# Patient Record
Sex: FEMALE | Race: WHITE | NOT HISPANIC OR LATINO | Employment: OTHER | URBAN - METROPOLITAN AREA
[De-identification: names, ages, dates, MRNs, and addresses within clinical notes are randomized per-mention and may not be internally consistent; named-entity substitution may affect disease eponyms.]

---

## 2017-09-12 ENCOUNTER — CONVERSION ENCOUNTER (OUTPATIENT)
Dept: MAMMOGRAPHY | Facility: HOSPITAL | Age: 67
End: 2017-09-12

## 2018-09-20 ENCOUNTER — OFFICE VISIT CONVERTED (OUTPATIENT)
Dept: FAMILY MEDICINE CLINIC | Facility: CLINIC | Age: 68
End: 2018-09-20
Attending: FAMILY MEDICINE

## 2018-09-20 ENCOUNTER — CONVERSION ENCOUNTER (OUTPATIENT)
Dept: FAMILY MEDICINE CLINIC | Facility: CLINIC | Age: 68
End: 2018-09-20

## 2018-12-04 ENCOUNTER — CONVERSION ENCOUNTER (OUTPATIENT)
Dept: MAMMOGRAPHY | Facility: HOSPITAL | Age: 68
End: 2018-12-04

## 2018-12-26 ENCOUNTER — CONVERSION ENCOUNTER (OUTPATIENT)
Dept: MAMMOGRAPHY | Facility: HOSPITAL | Age: 68
End: 2018-12-26

## 2019-03-18 ENCOUNTER — HOSPITAL ENCOUNTER (OUTPATIENT)
Dept: LAB | Facility: HOSPITAL | Age: 69
Discharge: HOME | End: 2019-03-18

## 2019-03-18 LAB
ALBUMIN SERPL-MCNC: 3.9 G/DL (ref 3.5–5)
ALBUMIN/GLOB SERPL: 1.4 {RATIO} (ref 1.4–2.6)
ALP SERPL-CCNC: 114 U/L (ref 43–160)
ALT SERPL-CCNC: 21 U/L (ref 10–40)
ANION GAP SERPL CALC-SCNC: 15 MMOL/L (ref 8–19)
AST SERPL-CCNC: 32 U/L (ref 15–50)
BASOPHILS # BLD AUTO: 0.03 10*3/UL (ref 0–0.2)
BASOPHILS NFR BLD AUTO: 0.4 % (ref 0–3)
BILIRUB SERPL-MCNC: 0.63 MG/DL (ref 0.2–1.3)
BUN SERPL-MCNC: 10 MG/DL (ref 5–25)
BUN/CREAT SERPL: 13 {RATIO} (ref 6–20)
CALCIUM SERPL-MCNC: 9.5 MG/DL (ref 8.7–10.4)
CHLORIDE SERPL-SCNC: 103 MMOL/L (ref 99–111)
CHOLEST SERPL-MCNC: 154 MG/DL (ref 107–200)
CHOLEST/HDLC SERPL: 3.4 {RATIO} (ref 3–6)
CONV ABS IMM GRAN: 0.05 10*3/UL (ref 0–0.2)
CONV CO2: 27 MMOL/L (ref 22–32)
CONV IMMATURE GRAN: 0.6 % (ref 0–1.8)
CONV TOTAL PROTEIN: 6.7 G/DL (ref 6.3–8.2)
CREAT UR-MCNC: 0.8 MG/DL (ref 0.5–0.9)
DEPRECATED RDW RBC AUTO: 53.3 FL (ref 36.4–46.3)
EOSINOPHIL # BLD AUTO: 0.08 10*3/UL (ref 0–0.7)
EOSINOPHIL # BLD AUTO: 1 % (ref 0–7)
ERYTHROCYTE [DISTWIDTH] IN BLOOD BY AUTOMATED COUNT: 14.9 % (ref 11.7–14.4)
GFR SERPLBLD BASED ON 1.73 SQ M-ARVRAT: >60 ML/MIN/{1.73_M2}
GLOBULIN UR ELPH-MCNC: 2.8 G/DL (ref 2–3.5)
GLUCOSE SERPL-MCNC: 121 MG/DL (ref 65–99)
HBA1C MFR BLD: 12.5 G/DL (ref 12–16)
HCT VFR BLD AUTO: 38.3 % (ref 37–47)
HDLC SERPL-MCNC: 45 MG/DL (ref 40–60)
LDLC SERPL CALC-MCNC: 91 MG/DL (ref 70–100)
LYMPHOCYTES # BLD AUTO: 1.99 10*3/UL (ref 1–5)
MCH RBC QN AUTO: 32.1 PG (ref 27–31)
MCHC RBC AUTO-ENTMCNC: 32.6 G/DL (ref 33–37)
MCV RBC AUTO: 98.5 FL (ref 81–99)
MONOCYTES # BLD AUTO: 0.37 10*3/UL (ref 0.2–1.2)
MONOCYTES NFR BLD AUTO: 4.7 % (ref 3–10)
NEUTROPHILS # BLD AUTO: 5.35 10*3/UL (ref 2–8)
NEUTROPHILS NFR BLD AUTO: 68 % (ref 30–85)
NRBC CBCN: 0 % (ref 0–0.7)
OSMOLALITY SERPL CALC.SUM OF ELEC: 292 MOSM/KG (ref 273–304)
PLATELET # BLD AUTO: 282 10*3/UL (ref 130–400)
PMV BLD AUTO: 10.9 FL (ref 9.4–12.3)
POTASSIUM SERPL-SCNC: 3.9 MMOL/L (ref 3.5–5.3)
RBC # BLD AUTO: 3.89 10*6/UL (ref 4.2–5.4)
SODIUM SERPL-SCNC: 141 MMOL/L (ref 135–147)
TRIGL SERPL-MCNC: 90 MG/DL (ref 40–150)
VARIANT LYMPHS NFR BLD MANUAL: 25.3 % (ref 20–45)
VLDLC SERPL-MCNC: 18 MG/DL (ref 5–37)
WBC # BLD AUTO: 7.87 10*3/UL (ref 4.8–10.8)

## 2019-03-27 ENCOUNTER — OFFICE VISIT CONVERTED (OUTPATIENT)
Dept: SURGERY | Facility: CLINIC | Age: 69
End: 2019-03-27
Attending: NURSE PRACTITIONER

## 2019-06-26 ENCOUNTER — HOSPITAL ENCOUNTER (OUTPATIENT)
Dept: ULTRASOUND IMAGING | Facility: HOSPITAL | Age: 69
Discharge: HOME | End: 2019-06-26
Attending: FAMILY MEDICINE

## 2019-07-15 ENCOUNTER — HOSPITAL ENCOUNTER (OUTPATIENT)
Dept: LAB | Facility: HOSPITAL | Age: 69
Discharge: HOME | End: 2019-07-15

## 2019-07-15 LAB
ALBUMIN SERPL-MCNC: 4 G/DL (ref 3.5–5)
ALBUMIN/GLOB SERPL: 1.6 {RATIO} (ref 1.4–2.6)
ALP SERPL-CCNC: 128 U/L (ref 43–160)
ALT SERPL-CCNC: 18 U/L (ref 10–40)
ANION GAP SERPL CALC-SCNC: 18 MMOL/L (ref 8–19)
AST SERPL-CCNC: 26 U/L (ref 15–50)
BASOPHILS # BLD AUTO: 0.04 10*3/UL (ref 0–0.2)
BASOPHILS NFR BLD AUTO: 0.5 % (ref 0–3)
BILIRUB SERPL-MCNC: 0.57 MG/DL (ref 0.2–1.3)
BUN SERPL-MCNC: 12 MG/DL (ref 5–25)
BUN/CREAT SERPL: 13 {RATIO} (ref 6–20)
CALCIUM SERPL-MCNC: 8.9 MG/DL (ref 8.7–10.4)
CHLORIDE SERPL-SCNC: 101 MMOL/L (ref 99–111)
CHOLEST SERPL-MCNC: 157 MG/DL (ref 107–200)
CHOLEST/HDLC SERPL: 3.1 {RATIO} (ref 3–6)
CONV ABS IMM GRAN: 0.03 10*3/UL (ref 0–0.2)
CONV CO2: 25 MMOL/L (ref 22–32)
CONV IMMATURE GRAN: 0.4 % (ref 0–1.8)
CONV TOTAL PROTEIN: 6.5 G/DL (ref 6.3–8.2)
CREAT UR-MCNC: 0.95 MG/DL (ref 0.5–0.9)
DEPRECATED RDW RBC AUTO: 51.5 FL (ref 36.4–46.3)
EOSINOPHIL # BLD AUTO: 0.07 10*3/UL (ref 0–0.7)
EOSINOPHIL # BLD AUTO: 0.9 % (ref 0–7)
ERYTHROCYTE [DISTWIDTH] IN BLOOD BY AUTOMATED COUNT: 14.8 % (ref 11.7–14.4)
GFR SERPLBLD BASED ON 1.73 SQ M-ARVRAT: >60 ML/MIN/{1.73_M2}
GLOBULIN UR ELPH-MCNC: 2.5 G/DL (ref 2–3.5)
GLUCOSE SERPL-MCNC: 95 MG/DL (ref 65–99)
HBA1C MFR BLD: 12.4 G/DL (ref 12–16)
HCT VFR BLD AUTO: 37.8 % (ref 37–47)
HDLC SERPL-MCNC: 51 MG/DL (ref 40–60)
LDLC SERPL CALC-MCNC: 89 MG/DL (ref 70–100)
LYMPHOCYTES # BLD AUTO: 1.92 10*3/UL (ref 1–5)
MCH RBC QN AUTO: 31.3 PG (ref 27–31)
MCHC RBC AUTO-ENTMCNC: 32.8 G/DL (ref 33–37)
MCV RBC AUTO: 95.5 FL (ref 81–99)
MONOCYTES # BLD AUTO: 0.59 10*3/UL (ref 0.2–1.2)
MONOCYTES NFR BLD AUTO: 7.7 % (ref 3–10)
NEUTROPHILS # BLD AUTO: 4.99 10*3/UL (ref 2–8)
NEUTROPHILS NFR BLD AUTO: 65.4 % (ref 30–85)
NRBC CBCN: 0 % (ref 0–0.7)
OSMOLALITY SERPL CALC.SUM OF ELEC: 290 MOSM/KG (ref 273–304)
PLATELET # BLD AUTO: 287 10*3/UL (ref 130–400)
PMV BLD AUTO: 10.4 FL (ref 9.4–12.3)
POTASSIUM SERPL-SCNC: 3.9 MMOL/L (ref 3.5–5.3)
RBC # BLD AUTO: 3.96 10*6/UL (ref 4.2–5.4)
SODIUM SERPL-SCNC: 140 MMOL/L (ref 135–147)
TRIGL SERPL-MCNC: 84 MG/DL (ref 40–150)
VARIANT LYMPHS NFR BLD MANUAL: 25.1 % (ref 20–45)
VLDLC SERPL-MCNC: 17 MG/DL (ref 5–37)
WBC # BLD AUTO: 7.64 10*3/UL (ref 4.8–10.8)

## 2019-07-26 ENCOUNTER — HOSPITAL ENCOUNTER (OUTPATIENT)
Dept: SURGERY | Facility: HOSPITAL | Age: 69
Setting detail: HOSPITAL OUTPATIENT SURGERY
Discharge: HOME | End: 2019-07-26
Attending: SURGERY

## 2019-08-15 ENCOUNTER — CONVERSION ENCOUNTER (OUTPATIENT)
Dept: SURGERY | Facility: CLINIC | Age: 69
End: 2019-08-15

## 2019-08-15 ENCOUNTER — OFFICE VISIT CONVERTED (OUTPATIENT)
Dept: SURGERY | Facility: CLINIC | Age: 69
End: 2019-08-15
Attending: SURGERY

## 2019-09-25 ENCOUNTER — OFFICE VISIT CONVERTED (OUTPATIENT)
Dept: FAMILY MEDICINE CLINIC | Facility: CLINIC | Age: 69
End: 2019-09-25
Attending: FAMILY MEDICINE

## 2019-12-06 ENCOUNTER — HOSPITAL ENCOUNTER (OUTPATIENT)
Dept: MAMMOGRAPHY | Facility: HOSPITAL | Age: 69
Discharge: HOME | End: 2019-12-06
Attending: FAMILY MEDICINE

## 2021-01-28 ENCOUNTER — HOSPITAL ENCOUNTER (OUTPATIENT)
Dept: OTHER | Facility: HOSPITAL | Age: 71
Discharge: HOME | End: 2021-01-28
Attending: INTERNAL MEDICINE

## 2021-02-23 ENCOUNTER — HOSPITAL ENCOUNTER (OUTPATIENT)
Dept: VACCINE CLINIC | Facility: HOSPITAL | Age: 71
Discharge: HOME | End: 2021-02-23
Attending: INTERNAL MEDICINE

## 2021-03-23 ENCOUNTER — HOSPITAL ENCOUNTER (OUTPATIENT)
Dept: FAMILY MEDICINE CLINIC | Facility: CLINIC | Age: 71
Discharge: HOME | End: 2021-03-23
Attending: FAMILY MEDICINE

## 2021-03-23 ENCOUNTER — OFFICE VISIT CONVERTED (OUTPATIENT)
Dept: FAMILY MEDICINE CLINIC | Facility: CLINIC | Age: 71
End: 2021-03-23
Attending: FAMILY MEDICINE

## 2021-05-14 VITALS
HEIGHT: 66 IN | HEART RATE: 101 BPM | TEMPERATURE: 98.6 F | BODY MASS INDEX: 23.46 KG/M2 | WEIGHT: 146 LBS | SYSTOLIC BLOOD PRESSURE: 120 MMHG | OXYGEN SATURATION: 97 % | DIASTOLIC BLOOD PRESSURE: 62 MMHG

## 2021-05-14 NOTE — PROGRESS NOTES
Progress Note      Patient Name: Luz Maria Burnett   Patient ID: [id number]   Sex: Female   YOB: 1950    Referring Provider: Remberto Shah III MD    Visit Date: 2021    Provider: Remberto Shah III MD   Location: [facility]   Location Address: [address] [place] [address]  [id number]   Location Phone: [phone]          Chief Complaint  · routine check up      History Of Present Illness  Luz Maria Burnett is a 70 year old /White female who presents for evaluation and treatment of: here today for routine check and lab work.      HPI     patient is a 70 years old here for routine yearly check up.  history of psoriasis on methotrexate strong family   history of colon cancer said to have had 2 brothers  of colon cancer patient has had numerous polyps last one on 2019 was a serrated polyp a few years earlier it had other hyperplastic polyps and then adenomatous polyps.  Dr. Rabago ordered a no other colonoscopy  which seems reasonable patient will think about there is no sure that she wants to.    Review of systems     respiratory no shortness of breath dyspnea exertion patient still smokes half pack a day has a 20+ pack years but does not want a yearly CT scans to screen for lung cancer.    Cardiovascular no chest pain no palpitations  GI no gut symptoms.  Line skin sees her regularly.  Musculoskeletal DEXA scan in 2019 is osteopenic not due till  taking vitamin D only taking occasionally needs to take daily 1000 international unit    Physical exam     pulse ox 97 rate 101 blood pressure 120/62 temperature 98.6 weight is 146 is a 3 pound increase  General no distress  Cardiovascular regular rhythm no murmur  Respiratory no increased work of breathing lungs clear equal bilaterally no rales no rhonchi no wheezes  Abdomen soft nontender had ultrasound in the office and your aorta 1.5 cm in 2019    Assessment     #1 smoker does not want  screening   #2 generalized anxiety depression continue fluoxetine  #3 psoriasis on methotrexate   #4 strong family history of cancer and numerous polyps both hyperplastic and adenomatous should have a screening this year 2021 she does not want at this time    Plan     recheck   call us if willing to do the or changes her mind on the low-dose CT of the chest.    Blood work is pending       Past Medical History  Disease Name Date Onset Notes   Colon Polyps --  adenoma   Depression --  --    Psoriasis --  --          Past Surgical History  Procedure Name Date Notes   Cataract exctraction with lens implant --  --    Colonoscopy 2009, 2011 --    Liver Surgery 2010 biopsy   Tubal ligation --  --          Medication List  Name Date Started Instructions   Cholestyramine Light 4 gram oral powder 08/15/2019 take 1 scoop (4 gram) dissolved in 2 to 6 ounces of water or noncarbonated beverage by oral route 2 times per day for 30 days   fluoxetine 20 mg oral capsule 07/22/2020 TAKE 1 CAPSULE(20 MG) BY MOUTH EVERY DAY   Imodium A-D 2 mg Oral tablet  take 2 tablets by oral route bid   methotrexate sodium 2.5 mg oral tablet 08/30/2017 take 4 tablets by oral route once a week for 90 days   Prilosec OTC 20 mg Oral tablet,delayed release (DR/EC)  take 1 tablet by oral route daily         Allergy List  Allergen Name Date Reaction Notes   NO KNOWN DRUG ALLERGIES --  --  --        Allergies Reconciled  Family Medical History  Disease Name Relative/Age Notes   Colon Neoplasm  --          Social History  Finding Status Start/Stop Quantity Notes   Active but no formal exercise --  --/-- --  --    Alcohol Never --/-- --  --     --  --/-- --  --    Tobacco Current every day 12/-- 1 ppd 04/06/2017 - pt smokes 1/2-1 ppd for 51 years          Immunizations  NameDate Admin Mfg Trade Name Lot Number Route Inj VIS Given VIS Publication   COVID Zinwggv9702/25/2021 MOD Moderna COVID-19 Vaccine  NE NE 03/23/2021    Comments:    COVID  "Myvlhtz8202/04/2021 MOD Moderna COVID-19 Vaccine  NE NE 03/23/2021    Comments:    Ilgfogfrx54/14/2020 SKB Fluarix, quadrivalent, preservative free [id number] NE NE 03/23/2021    Comments:    Prevnar 1308/10/2016 [facility] PREVNAR 13 [id number] IM LD 08/10/2016 11/05/2015   Comments:          Review of Systems  · Constitutional  o * See HPI  · Eyes  o * See HPI  · HENT  o * See HPI  · Breasts  o * See HPI  · Cardiovascular  o * See HPI  · Respiratory  o * See HPI  · Gastrointestinal  o * See HPI  · Genitourinary  o * See HPI  · Integument  o * See HPI  · Neurologic  o * See HPI  · Musculoskeletal  o * See HPI  · Endocrine  o * See HPI  · Psychiatric  o * See HPI  · Heme-Lymph  o * See HPI  · Allergic-Immunologic  o * See HPI      Vitals  Date Time BP Position Site L\R Cuff Size HR RR TEMP (F) WT  HT  BMI kg/m2 BSA m2 O2 Sat FR L/min FiO2 HC       03/23/2021 02:51 /62 Sitting    101 - R  98.6 146lbs 0oz 5'  6\" 23.56 1.76 97 %                    Assessment  · Screening for depression     V79.0/Z13.89  · Screening for colon cancer     V76.51/Z12.11  · Encounter for screening for cardiovascular disorders     V81.2/Z13.6  · Nicotine dependence     305.1/F17.200  · Tobacco abuse counseling       Tobacco abuse counseling     V65.42/Z71.6  · Psoriasis     696.8  · Colon Polyps     211.4/K63.5  adenoma  · Medication management     V58.69/Z79.899  · Routine adult health maintenance     V70.0/Z00.00  · Anxiety and depression       Anxiety disorder, unspecified     300.00/F41.9  Major depressive disorder, single episode, unspecified     300.00/F32.9  · Lung cancer screening declined by patient     V64.2/Z53.20  · Osteopenia determined by x-ray     733.90/M85.80    Problems Reconciled  Plan  · Orders  o ACO-17: Screened for tobacco use AND received tobacco cessation intervention (4004F) - 305.1/F17.200 - 03/23/2021  o ACO-17: Screened for tobacco use AND received tobacco cessation intervention (4004F) - V65.42/Z71.6 - 03/23/2021  o ACO " - Pt declines to or was not able to provide an Advance Care Plan or name a Surrogate Decision Maker (1124F) - - 03/23/2021  o Physical, Primary Care Panel (CBC, CMP, Lipid, TSH) [facility] (85904, 40246, 32340, 17079) - V81.2/Z13.6, V58.69/Z79.899, V70.0/Z00.00 - 03/23/2021  o ACO-15: Pneumococcal Vaccine Administered or Previously Received [facility] (4040F) - - 03/23/2021  o ACO-14: Influenza immunization administered or previously received [facility] () - - 03/23/2021  o ACO-39: Current medications updated and reviewed (1159F, ) - - 03/23/2021  · Medications  o fluoxetine 20 mg oral capsule   SIG: take 1 capsule (20 mg) by oral route once daily in the evening for 90 days   DISP: (90) Capsule with 3 refills  Adjusted on 03/23/2021     o Cholestyramine Light 4 gram oral powder   SIG: take 1 scoop (4 gram) dissolved in 2 to 6 ounces of water or noncarbonated beverage by oral route 2 times per day for 30 days   DISP: (1) 210 gm can with 6 refills  Discontinued on 03/23/2021     o Medications have been Reconciled  o Transition of Care or Provider Policy  · Instructions  o Depression Screen completed and scanned into the EMR under the designated folder within the patient's documents.  o Today's PHQ-9 result is __2_  o CMS (Medicare) estimates that one in six persons older than 65 suffers from depression and that depression in older patients occurs in 25% of those with other medical illnesses. US Preventative Services Task Force indicates that depression screening and support improves clinical outcomes in older adults. This service is covered by Medicare once a year as part of helping maintain a healthy you!  o The provider screening met the required time of 15 minutes.  o Patient declines colorectal cancer screening. Discussed risks associated with not having screening performed.   o *Form of nicotine being used: cigarettes  o Patient was strongly encouraged to discontinue use of any nicotine containing product or minimize the  use of the product.  o Tobacco and smoking cessation counseling for more than 3 minutes was completed.  o Patient is taking medications as prescribed and doing well.   o Take all medications as prescribed/directed.  o Patient instructed/educated on their diet and exercise program.  o Patient was educated/instructed on their diagnosis, treatment and medications prior to discharge from the clinic today.  o Patient instructed to seek medical attention urgently for new or worsening symptoms.  o Bring all medicines with their bottles to each office visit.  o Time spent with the patient was 30 minutes, more than 50% face to face.  o Chronic conditions reviewed and taken into consideration for today's treatment plan.  o Discussed Covid-19 precautions including, but not limited to, social distancing, avoid touching your face, and hand washing.             Electronically Signed by: Lynette Cummings, -Author on March 23, 2021 03:52:27 PM  Electronically Co-signed by: Remberto Shah III MD -Reviewer on March 24, 2021 05:52:29 PM

## 2021-05-14 NOTE — PROGRESS NOTES
Progress Note      Patient Name: Luz Maria Burnett   Patient ID: [id number]   Sex: Female   YOB: 1950    Referring Provider: Remberto Shah III MD    Visit Date: March 23, 2021    Provider: Remberto Shah III MD   Location: [facility]   Location Address: [address] [place] [address]  [id number]   Location Phone: [phone]          Chief Complaint  · Annual Wellness Exam      History Of Present Illness  The patient is a 70 year old /White female who has come to this office for her Annual Wellness Visit.   Her Primary Care Provider is Remberto Shah III, MD. Her comprehensive Care Team list, including suppliers, has been updated on the Facesheet. Her medical/family history, height, weight, BMI, and blood pressure have been reviewed and are in the chart. The Health Risk Assessment has been completed and scanned in the chart.   Medications are listed in the medication list.   The active problem list includes: Anxiety and depression, Colon Polyps, Depression, Lung cancer screening declined by patient, Medication management, Nicotine dependence, and Psoriasis   The patient does not have a history of substance use.   Patient reports her diet is adequate.   The Mini-Cog has been administered and is scanned in chart. The results are negative. Her cognitive function is without limitation.   A hearing loss screen was completed today and the result is negative.   Patient does not have any risk factors for depression. Patient completed the PHQ-9 today and it has been scanned in the chart. The total score is 1-4.   The Get Up and Go screen was administered today and the result is negative.   The Blair Index of [place] in ADLs indicated full function (score of 6).   A Falls Risk Assessment has been completed, including a review of home fall hazards and medication review.   Overall, the patient's functional ability is noted by this provider to be within normal  limits. Her level of safety is noted to be within normal limits. Her balance/gait is within normal limits. There have been no falls in the past year. Patient-specific home safety recommendations have been reviewed and a copy has been given to patient.   She denies issues with leaking urine.   There are no additional risk factors identified.   Living Will/Advanced Directive has not previously been completed.   Personalized health advice was given to the patient and a written health screening schedule was established; see Plan for details.       Past Medical History  Disease Name Date Onset Notes   Anxiety and depression 03/23/2021 --    Colon Polyps --  adenoma   Depression --  --    Lung cancer screening declined by patient 03/23/2021 --    Medication management 03/23/2021 --    Nicotine dependence 03/23/2021 --    Psoriasis --  --          Past Surgical History  Procedure Name Date Notes   Cataract exctraction with lens implant --  --    Colonoscopy 2009, 2011 --    Liver Surgery 2010 biopsy   Tubal ligation --  --          Medication List  Name Date Started Instructions   fluoxetine 20 mg oral capsule 03/23/2021 take 1 capsule (20 mg) by oral route once daily in the evening for 90 days   Imodium A-D 2 mg Oral tablet  take 2 tablets by oral route bid   methotrexate sodium 2.5 mg oral tablet 08/30/2017 take 4 tablets by oral route once a week for 90 days   Prilosec OTC 20 mg Oral tablet,delayed release (DR/EC)  take 1 tablet by oral route daily         Allergy List  Allergen Name Date Reaction Notes   NO KNOWN DRUG ALLERGIES --  --  --        Allergies Reconciled  Family Medical History  Disease Name Relative/Age Notes   Colon Neoplasm  --          Social History  Finding Status Start/Stop Quantity Notes   Active but no formal exercise --  --/-- --  --    Alcohol Never --/-- --  --     --  --/-- --  --    Tobacco Current every day 12/-- 1 ppd 03/23/2021 - 3/4 pack QD 04/06/2017 - pt smokes 1/2-1 ppd for 51  "years          Immunizations  NameDate Admin Mfg Trade Name Lot Number Route Inj VIS Given VIS Publication   COVID Tzykeeq4302/25/2021 MOD Moderna COVID-19 Vaccine  NE NE 03/23/2021    Comments:    COVID Ihadjws3002/04/2021 MOD Moderna COVID-19 Vaccine  NE NE 03/23/2021    Comments:    Alnpjmyxw78/14/2020 SKB Fluarix, quadrivalent, preservative free [id number] NE NE 03/23/2021    Comments:    Prevnar 1308/10/2016 WAL PREVNAR 13 [id number] LD 08/10/2016 11/05/2015   Comments:          Vitals  Date Time BP Position Site L\R Cuff Size HR RR TEMP (F) WT  HT  BMI kg/m2 BSA m2 O2 Sat FR L/min FiO2 HC       03/23/2021 02:51 /62 Sitting    101 - R  98.6 146lbs 0oz 5'  6\" 23.56 1.76 97 %                Assessment  · Encounter for Medicare annual wellness exam     V70.0/Z00.00  · Screening for depression     V79.0/Z13.89  · Screening for alcoholism     V79.1/Z13.39  · Advanced care planning/counseling discussion     V65.49/Z71.89  · Nicotine dependence     305.1/F17.200    Problems Reconciled  Plan  · Orders  o Falls Risk Assessment Completed (3288F) - V70.0/Z00.00 - 03/23/2021  o Brief hearing screening (written) [facility] () - V70.0/Z00.00 - 03/23/2021  o Annual Wellness Visit-includes a Personalized Prevention Plan of Service (PPS), SUBSEQUENT VISIT (Medicare) () - V70.0/Z00.00 - 03/23/2021  o ACO-13: Fall Risk Screening with no falls in past year or only one fall without injury in the past year (1101F) - V70.0/Z00.00 - 03/23/2021  o Presence or absence of urinary incontinence assessed (RICHARD) (1090F) - V70.0/Z00.00 - 03/23/2021  o Annual depression screening using the PHQ-9 tool, 15 minutes (22043, ) - V79.0/Z13.89 - 03/23/2021  o ACO-18: Negative screen for clinical depression using a standardized tool () - V79.0/Z13.89 - 03/23/2021  o Annual alcohol screening using the AUDIT-C tool, 15 minutes [facility] (32269, ) - V79.1/Z13.39 - 03/23/2021  o Negative alcohol screening () - V79.1/Z13.39 - " 03/23/2021  o Smoking cessation counseling, 3-10 minutes [facility] (01554) - 305.1/F17.200 - 03/23/2021  o ACO-17: Screened for tobacco use AND received tobacco cessation intervention (4004F) - 305.1/F17.200 - 03/23/2021  o ACO-19: Colorectal cancer screening results documented and reviewed (3017F) - - 03/23/2021  o ACO-20: Screening Mammography documented and reviewed () - - 03/23/2021  o Influenza immunization was not ordered or administered for reasons documented by clinician () - - 03/23/2021  o ACO-15: Pneumococcal Vaccine Administered or Previously Received (4040F) - - 03/23/2021  o ACO-39: Current medications updated and reviewed (1159F, ) - - 03/23/2021  · Medications  o Medications have been Reconciled  o Transition of Care or Provider Policy  · Instructions  o Health Risk Assessment has been reviewed with the patient.  o Written health screening schedule for next 5-10 years was established with patient; information scanned in chart and given/mailed to patient.  o Fall prevention methods discussed and a copy of recommendations given/mailed to patient.  o Today's PHQ-9 score is: _2__  o Audit-C Questionnaire completed and scanned into the EMR under the designated folder within the patient's documents.  o Audit-C score of 0-4 - Negative Screen - Brief Discussion  o CMS (Medicare) covers annual screening for adults for alcohol misuse screening to assure accurate diagnosis, effective treatment, and follow up where appropriate.  o Alcohol use disorder is the most prevalent type of addictive disorder in those 65 and older. It is often associated with depression, and contributes to serious medical conditions, including liver disease and coronary heart disease.  o Face-to-face Advanced Care Planning discussed for a minimum of 16 minutes.  o Discussed benefits of smoking cessation and given suggestions on how to stop smoking. Today's discussion lasted 3-10 minutes.             Electronically Signed by:  Lynette Cummings, -Author on March 23, 2021 04:22:28 PM  Electronically Co-signed by: Remberto Shah III MD -Reviewer on March 24, 2021 05:52:20 PM

## 2021-05-15 VITALS
HEIGHT: 66 IN | SYSTOLIC BLOOD PRESSURE: 120 MMHG | BODY MASS INDEX: 22.98 KG/M2 | WEIGHT: 143 LBS | DIASTOLIC BLOOD PRESSURE: 62 MMHG

## 2021-05-15 VITALS — HEIGHT: 66 IN | BODY MASS INDEX: 23.16 KG/M2 | RESPIRATION RATE: 14 BRPM | WEIGHT: 144.12 LBS

## 2021-05-15 VITALS — WEIGHT: 144 LBS | BODY MASS INDEX: 23.14 KG/M2 | RESPIRATION RATE: 16 BRPM | HEIGHT: 66 IN

## 2021-05-16 VITALS
HEIGHT: 66 IN | BODY MASS INDEX: 23.3 KG/M2 | SYSTOLIC BLOOD PRESSURE: 130 MMHG | DIASTOLIC BLOOD PRESSURE: 62 MMHG | WEIGHT: 145 LBS

## 2021-10-12 ENCOUNTER — OFFICE VISIT (OUTPATIENT)
Dept: SURGERY | Facility: CLINIC | Age: 71
End: 2021-10-12

## 2021-10-12 ENCOUNTER — PREP FOR SURGERY (OUTPATIENT)
Dept: OTHER | Facility: HOSPITAL | Age: 71
End: 2021-10-12

## 2021-10-12 VITALS — WEIGHT: 145.6 LBS | HEART RATE: 87 BPM | BODY MASS INDEX: 23.4 KG/M2 | HEIGHT: 66 IN

## 2021-10-12 DIAGNOSIS — R19.4 CHANGE IN BOWEL HABITS: Primary | ICD-10-CM

## 2021-10-12 DIAGNOSIS — Z86.010 HISTORY OF COLONIC POLYPS: ICD-10-CM

## 2021-10-12 DIAGNOSIS — Z80.0 FAMILY HISTORY OF COLON CANCER: ICD-10-CM

## 2021-10-12 DIAGNOSIS — R19.7 DIARRHEA, UNSPECIFIED TYPE: Primary | ICD-10-CM

## 2021-10-12 DIAGNOSIS — R19.4 CHANGE IN BOWEL HABITS: ICD-10-CM

## 2021-10-12 DIAGNOSIS — R19.7 DIARRHEA: ICD-10-CM

## 2021-10-12 PROCEDURE — 99213 OFFICE O/P EST LOW 20 MIN: CPT | Performed by: NURSE PRACTITIONER

## 2021-10-12 RX ORDER — POLYETHYLENE GLYCOL 3350 17 G/17G
POWDER, FOR SOLUTION ORAL
Qty: 238 EACH | Refills: 0 | Status: ON HOLD | OUTPATIENT
Start: 2021-10-12 | End: 2021-12-09

## 2021-10-12 RX ORDER — SODIUM CHLORIDE 0.9 % (FLUSH) 0.9 %
3 SYRINGE (ML) INJECTION EVERY 12 HOURS SCHEDULED
Status: CANCELLED | OUTPATIENT
Start: 2021-10-12

## 2021-10-12 RX ORDER — LOPERAMIDE HYDROCHLORIDE 2 MG/1
TABLET ORAL
COMMUNITY

## 2021-10-12 RX ORDER — SODIUM CHLORIDE 0.9 % (FLUSH) 0.9 %
10 SYRINGE (ML) INJECTION AS NEEDED
Status: CANCELLED | OUTPATIENT
Start: 2021-10-12

## 2021-10-12 RX ORDER — OMEPRAZOLE 20 MG/1
TABLET, DELAYED RELEASE ORAL
COMMUNITY

## 2021-10-12 RX ORDER — ASPIRIN 325 MG
TABLET ORAL
COMMUNITY
End: 2021-12-08

## 2021-10-12 RX ORDER — FLUOXETINE HYDROCHLORIDE 20 MG/1
20 CAPSULE ORAL DAILY
COMMUNITY
Start: 2021-09-18 | End: 2022-03-16

## 2021-10-12 NOTE — PROGRESS NOTES
Chief Complaint: Follow-up (Pt had colonoscopy in July,2019 and is following up.) and Colonoscopy (Following up for colon consult )    Subjective      Colonoscopy consultation         History of Present Illness  Luz Maria Burnett is a 70 y.o. female presents to [facility] for colonoscopy consultation.    Patient presents today with complaints of diarrhea.    Reports that she has to take 3 Imodium every day, to control diarrhea.    Reports that she does have some solid stools occasionally, however they are narrow pencillike in size.    Denies any rectal bleeding or abdominal pain.    Admits to a strong family history of 2 brothers and one nephew with colon cancer.    7/19: colonoscopy (João): Ascending - serrated polyp.    3/14: colonoscopy (João): @20cm - serrated hyperplastic; @15cm - tubular adenoma.    3/11: Colonoscopy (João); Ascending - hyperplastic.    2/09: Colonoscopy (Luz Elena): @15cm - Tubulovillous adenoma.     Objective     History reviewed. No pertinent past medical history.    History reviewed. No pertinent surgical history.      Current Outpatient Medications:   •  FLUoxetine (PROzac) 20 MG capsule, , Disp: , Rfl:   •  loperamide (Imodium A-D) 2 MG tablet, Imodium A-D 2 mg oral tablet take 2 tablets by oral route bid   Active, Disp: , Rfl:   •  methotrexate 2.5 MG tablet, , Disp: , Rfl:   •  omeprazole OTC (PrilOSEC OTC) 20 MG EC tablet, Prilosec OTC 20 mg oral tablet,delayed release (DR/EC) take 1 tablet by oral route daily   Active, Disp: , Rfl:   •  aspirin 325 MG tablet, aspirin 325 mg oral tablet take 1 tablet (325 mg) by oral route once daily   Suspended, Disp: , Rfl:   •  polyethylene glycol (MIRALAX) 17 g packet, Take as directed.  Instructions given in office.  Dispense: 238 g bottle, Disp: 238 each, Rfl: 0    No Known Allergies     History reviewed. No pertinent family history.    Social History     Socioeconomic History   • Marital status:    Tobacco  "Use   • Smoking status: Current Every Day Smoker     Types: Cigarettes   • Smokeless tobacco: Never Used   Vaping Use   • Vaping Use: Never used   Substance and Sexual Activity   • Alcohol use: Never   • Drug use: Never       Review of Systems     Vital Signs:   Pulse 87   Ht 167.6 cm (66\")   Wt 66 kg (145 lb 9.6 oz)   BMI 23.50 kg/m²      Physical Exam  Constitutional:       Appearance: Normal appearance.   HENT:      Head: Normocephalic.   Cardiovascular:      Rate and Rhythm: Normal rate.   Pulmonary:      Effort: Pulmonary effort is normal.   Abdominal:      General: Abdomen is flat.      Palpations: Abdomen is soft.   Skin:     General: Skin is warm and dry.   Neurological:      General: No focal deficit present.      Mental Status: She is alert and oriented to person, place, and time.   Psychiatric:         Mood and Affect: Mood normal.         Thought Content: Thought content normal.          Result Review :              []  Laboratory  []  Radiology  [x]  Pathology  []  Microbiology  []  EKG/Telemetry   []  Cardiology/Vascular   [x]  Old records       Assessment and Plan    Diagnoses and all orders for this visit:    1. Diarrhea, unspecified type (Primary)    2. Family history of colon cancer    3. Change in bowel habits    4. History of colonic polyps    Other orders  -     polyethylene glycol (MIRALAX) 17 g packet; Take as directed.  Instructions given in office.  Dispense: 238 g bottle  Dispense: 238 each; Refill: 0        Follow Up   Return for Schedule colonoscopy with Dr. Kennedy on 12/9/2021 at [facility].     Hospital call with arrival time.    Patient was given instructions and counseling regarding her condition or for health maintenance advice. Please see specific information pulled into the AVS if appropriate.           "

## 2021-12-08 NOTE — PRE-PROCEDURE INSTRUCTIONS
Called patient and discussed laxative and skin prep. Discussed clear liquid diet and pre-op medications. Patient aware she can take prozac and omeprazole. Patient stated understanding. No other issues or concerns noted currently per patient.  Patient is vaccinated for covid-19 and will bring vaccination card.

## 2021-12-09 ENCOUNTER — HOSPITAL ENCOUNTER (OUTPATIENT)
Facility: HOSPITAL | Age: 71
Setting detail: HOSPITAL OUTPATIENT SURGERY
Discharge: HOME | End: 2021-12-09
Attending: SURGERY | Admitting: SURGERY

## 2021-12-09 ENCOUNTER — ANESTHESIA EVENT (OUTPATIENT)
Dept: GASTROENTEROLOGY | Facility: HOSPITAL | Age: 71
End: 2021-12-09

## 2021-12-09 ENCOUNTER — ANESTHESIA (OUTPATIENT)
Dept: GASTROENTEROLOGY | Facility: HOSPITAL | Age: 71
End: 2021-12-09

## 2021-12-09 VITALS
SYSTOLIC BLOOD PRESSURE: 125 MMHG | BODY MASS INDEX: 22.04 KG/M2 | OXYGEN SATURATION: 99 % | TEMPERATURE: 97.2 F | RESPIRATION RATE: 16 BRPM | WEIGHT: 137.13 LBS | HEIGHT: 66 IN | HEART RATE: 81 BPM | DIASTOLIC BLOOD PRESSURE: 82 MMHG

## 2021-12-09 DIAGNOSIS — R19.7 DIARRHEA: ICD-10-CM

## 2021-12-09 DIAGNOSIS — R19.4 CHANGE IN BOWEL HABITS: ICD-10-CM

## 2021-12-09 DIAGNOSIS — Z80.0 FAMILY HISTORY OF COLON CANCER: ICD-10-CM

## 2021-12-09 PROCEDURE — 88305 TISSUE EXAM BY PATHOLOGIST: CPT | Performed by: SURGERY

## 2021-12-09 PROCEDURE — 25010000002 PROPOFOL 10 MG/ML EMULSION: Performed by: NURSE ANESTHETIST, CERTIFIED REGISTERED

## 2021-12-09 RX ORDER — SODIUM CHLORIDE, SODIUM LACTATE, POTASSIUM CHLORIDE, CALCIUM CHLORIDE 600; 310; 30; 20 MG/100ML; MG/100ML; MG/100ML; MG/100ML
30 INJECTION, SOLUTION INTRAVENOUS CONTINUOUS
Status: DISCONTINUED | OUTPATIENT
Start: 2021-12-09 | End: 2021-12-09 | Stop reason: HOSPADM

## 2021-12-09 RX ORDER — SODIUM CHLORIDE 0.9 % (FLUSH) 0.9 %
10 SYRINGE (ML) INJECTION AS NEEDED
Status: DISCONTINUED | OUTPATIENT
Start: 2021-12-09 | End: 2021-12-09 | Stop reason: HOSPADM

## 2021-12-09 RX ORDER — PROPOFOL 10 MG/ML
VIAL (ML) INTRAVENOUS AS NEEDED
Status: DISCONTINUED | OUTPATIENT
Start: 2021-12-09 | End: 2021-12-09 | Stop reason: SURG

## 2021-12-09 RX ORDER — SODIUM CHLORIDE 0.9 % (FLUSH) 0.9 %
3 SYRINGE (ML) INJECTION EVERY 12 HOURS SCHEDULED
Status: DISCONTINUED | OUTPATIENT
Start: 2021-12-09 | End: 2021-12-09 | Stop reason: HOSPADM

## 2021-12-09 RX ORDER — LIDOCAINE HYDROCHLORIDE 20 MG/ML
INJECTION, SOLUTION INFILTRATION; PERINEURAL AS NEEDED
Status: DISCONTINUED | OUTPATIENT
Start: 2021-12-09 | End: 2021-12-09 | Stop reason: SURG

## 2021-12-09 RX ADMIN — SODIUM CHLORIDE, POTASSIUM CHLORIDE, SODIUM LACTATE AND CALCIUM CHLORIDE: 600; 310; 30; 20 INJECTION, SOLUTION INTRAVENOUS at 10:21

## 2021-12-09 RX ADMIN — PROPOFOL 2 MCG/KG/MIN: 10 INJECTION, EMULSION INTRAVENOUS at 10:25

## 2021-12-09 RX ADMIN — PROPOFOL 200 MCG/KG/MIN: 10 INJECTION, EMULSION INTRAVENOUS at 10:24

## 2021-12-09 RX ADMIN — LIDOCAINE HYDROCHLORIDE 60 MG: 20 INJECTION, SOLUTION INFILTRATION; PERINEURAL at 10:24

## 2021-12-09 RX ADMIN — PROPOFOL 60 MG: 10 INJECTION, EMULSION INTRAVENOUS at 10:24

## 2021-12-09 NOTE — ANESTHESIA PREPROCEDURE EVALUATION
Anesthesia Evaluation     Patient summary reviewed and Nursing notes reviewed   no history of anesthetic complications:  NPO Solid Status: > 8 hours  NPO Liquid Status: > 2 hours           Airway   Mallampati: II  TM distance: >3 FB  Neck ROM: full  No difficulty expected  Dental      Pulmonary - negative pulmonary ROS and normal exam    breath sounds clear to auscultation  Cardiovascular - negative cardio ROS and normal exam  Exercise tolerance: good (4-7 METS)    Rhythm: regular        Neuro/Psych- negative ROS  GI/Hepatic/Renal/Endo    (+)  GERD,      Musculoskeletal (-) negative ROS    Abdominal    Substance History - negative use     OB/GYN negative ob/gyn ROS         Other - negative ROS                       Anesthesia Plan    ASA 2     general   (Total IV Anesthesia    Patient understands anesthesia not responsible for dental damage.  )  intravenous induction     Anesthetic plan, all risks, benefits, and alternatives have been provided, discussed and informed consent has been obtained with: patient.    Plan discussed with CRNA.

## 2021-12-09 NOTE — H&P
General Surgery/Colorectal Surgery Note    Patient Name:  Luz Maria Burnett  YOB: 1950  [id number]    Referring Provider: Mil Kennedy, *      Patient Care Team:  Remberto Shah III, MD as PCP - General (Family Medicine)  Heydi Irvin APRN as Nurse Practitioner (Nurse Practitioner)    Subjective .     History of present illness:    HPI   No changes since last seen    Patient presents today with complaints of diarrhea.     Reports that she has to take 3 Imodium every day, to control diarrhea.     Reports that she does have some solid stools occasionally, however they are narrow pencillike in size.     Denies any rectal bleeding or abdominal pain.     Admits to a strong family history of 2 brothers and one nephew with colon cancer.     7/19: colonoscopy (João): Ascending - serrated polyp.     3/14: colonoscopy (João): @20cm - serrated hyperplastic; @15cm - tubular adenoma.     3/11: Colonoscopy (João); Ascending - hyperplastic.     2/09: Colonoscopy (Luz Elena): @15cm - Tubulovillous adenoma.     History:  Past Medical History:   Diagnosis Date   • GERD (gastroesophageal reflux disease)    • Psoriasis        Past Surgical History:   Procedure Laterality Date   • COLONOSCOPY     • LAPAROSCOPIC TUBAL LIGATION         Family History   Problem Relation Age of Onset   • Heart disease Father    • Diabetes Father    • Malig Hyperthermia Neg Hx        Social History     Tobacco Use   • Smoking status: Current Every Day Smoker     Packs/day: 0.50     Types: Cigarettes   • Smokeless tobacco: Never Used   Vaping Use   • Vaping Use: Never used   Substance Use Topics   • Alcohol use: Never   • Drug use: Never       Review of Systems    Review of Systems see HPI           Medications Prior to Admission   Medication Sig Dispense Refill Last Dose   • FLUoxetine (PROzac) 20 MG capsule Take 20 mg by mouth Daily.   12/8/2021 at Unknown time   • loperamide (Imodium A-D) 2 MG tablet Imodium A-D 2 mg oral tablet take 2  tablets by oral route bid   Active   12/7/2021 at Unknown time   • methotrexate 2.5 MG tablet Take 2.5 mg by mouth 1 (One) Time Per Week.   12/8/2021 at Unknown time   • omeprazole OTC (PrilOSEC OTC) 20 MG EC tablet Prilosec OTC 20 mg oral tablet,delayed release (DR/EC) take 1 tablet by oral route daily   Active   12/8/2021 at Unknown time         Home Meds:      Prior to Admission medications    Medication Sig Start Date End Date Taking? Authorizing Provider   FLUoxetine (PROzac) 20 MG capsule Take 20 mg by mouth Daily. 9/18/21  Yes Frankie Farr MD   loperamide (Imodium A-D) 2 MG tablet Imodium A-D 2 mg oral tablet take 2 tablets by oral route bid   Active   Yes Frankie Farr MD   methotrexate 2.5 MG tablet Take 2.5 mg by mouth 1 (One) Time Per Week. 1/1/10  Yes Frankie Farr MD   omeprazole OTC (PrilOSEC OTC) 20 MG EC tablet Prilosec OTC 20 mg oral tablet,delayed release (DR/EC) take 1 tablet by oral route daily   Active   Yes Frankie Farr MD   polyethylene glycol (MIRALAX) 17 g packet Take as directed.  Instructions given in office.  Dispense: 238 g bottle 10/12/21 12/9/21  Albaro, April, APRN            Allergies:  Patient has no known allergies.      Objective     Vital Signs   Temp:  [97.6 °F (36.4 °C)] 97.6 °F (36.4 °C)  Resp:  [18] 18  BP: (140)/(74) 140/74    Physical Exam:     Physical Exam    NAD, A/O x 4, normal circulation, normal respiration      Result Review :     Assessment/Plan     Diagnoses and all orders for this visit:    1. Change in bowel habits  Overview:  Added automatically from request for surgery [id number]    Orders:  -     sodium chloride 0.9 % flush 10 mL  -     sodium chloride 0.9 % flush 3 mL    2. Diarrhea  Overview:  Added automatically from request for surgery [id number]    Orders:  -     sodium chloride 0.9 % flush 10 mL  -     sodium chloride 0.9 % flush 3 mL    3. Family history of colon cancer  Overview:  Added automatically from request for surgery  [id number]    Orders:  -     sodium chloride 0.9 % flush 10 mL  -     sodium chloride 0.9 % flush 3 mL    Other orders  -     POC Glucose Once; Standing  -     Insert Peripheral IV; Standing  -     Cancel: Saline Lock & Maintain IV Access; Standing  -     lactated ringers infusion  -     Pregnancy, Urine - Urine, Clean Catch; Standing  -     POC Glucose Once  -     Insert Peripheral IV  -     Cancel: Saline Lock & Maintain IV Access  -     Pregnancy, Urine - Urine, Clean Catch  -     Follow Anesthesia Guidelines / Protocol; Standing  -     Insert Peripheral IV; Standing  -     Saline Lock & Maintain IV Access; Standing  -     Give Tap Water Enema If Bowel Prep Insufficient; Standing  -     Obtain Informed Consent; Standing  -     Verify Bowel Prep Was Successful; Standing  -     Verify NPO Status; Standing  -     Follow Anesthesia Guidelines / Protocol  -     Insert Peripheral IV  -     Saline Lock & Maintain IV Access  -     Give Tap Water Enema If Bowel Prep Insufficient  -     Obtain Informed Consent  -     Verify Bowel Prep Was Successful  -     Verify NPO Status           Risks including bleeding, perforation, pain, infection, benefits, and alternatives of Colonoscopy discussed with patient.  All questions answered.  Consent verified.         Mil Kennedy MD  12/09/21 09:17 EST

## 2021-12-09 NOTE — ANESTHESIA POSTPROCEDURE EVALUATION
Patient: Luz Maria Burnett    Procedure Summary     Date: 12/09/21 Room / Location: [facility] ENDOSCOPY 3 / [facility] ENDOSCOPY    Anesthesia Start: 1021 Anesthesia Stop:     Procedure: COLONOSCOPY WITH BIOPSY (N/A ) Diagnosis:       Change in bowel habits      Diarrhea      Family history of colon cancer      (Change in bowel habits [R19.4])      (Diarrhea [R19.7])      (Family history of colon cancer [Z80.0])    Surgeons: Mil Kennedy MD Provider: David Harrison MD    Anesthesia Type: general ASA Status: 2          Anesthesia Type: general    Vitals  Vitals Value Taken Time   /59 12/09/21 1041   Temp 36.6 °C (97.9 °F) 12/09/21 1041   Pulse 79 12/09/21 1044   Resp 20 12/09/21 1041   SpO2 97 % 12/09/21 1044   Vitals shown include unvalidated device data.        Post Anesthesia Care and Evaluation    Patient location during evaluation: bedside  Patient participation: complete - patient participated  Level of consciousness: awake and alert  Pain management: adequate  Airway patency: patent  Anesthetic complications: No anesthetic complications  PONV Status: none  Cardiovascular status: acceptable  Respiratory status: acceptable  Hydration status: acceptable

## 2021-12-10 LAB
CYTO UR: NORMAL
LAB AP CASE REPORT: NORMAL
LAB AP CLINICAL INFORMATION: NORMAL
PATH REPORT.FINAL DX SPEC: NORMAL
PATH REPORT.GROSS SPEC: NORMAL

## 2022-01-12 ENCOUNTER — TELEPHONE (OUTPATIENT)
Dept: SURGERY | Facility: CLINIC | Age: 72
End: 2022-01-12

## 2022-03-16 DIAGNOSIS — F32.A DEPRESSION, UNSPECIFIED DEPRESSION TYPE: Primary | ICD-10-CM

## 2022-03-16 RX ORDER — FLUOXETINE HYDROCHLORIDE 20 MG/1
CAPSULE ORAL
Qty: 90 CAPSULE | Refills: 0 | Status: SHIPPED | OUTPATIENT
Start: 2022-03-16 | End: 2022-04-04 | Stop reason: SDUPTHER

## 2022-04-04 ENCOUNTER — OFFICE VISIT (OUTPATIENT)
Dept: FAMILY MEDICINE CLINIC | Facility: CLINIC | Age: 72
End: 2022-04-04

## 2022-04-04 VITALS
BODY MASS INDEX: 21.69 KG/M2 | OXYGEN SATURATION: 98 % | WEIGHT: 135 LBS | HEART RATE: 82 BPM | SYSTOLIC BLOOD PRESSURE: 110 MMHG | HEIGHT: 66 IN | DIASTOLIC BLOOD PRESSURE: 70 MMHG | TEMPERATURE: 98 F

## 2022-04-04 DIAGNOSIS — F41.9 ANXIETY AND DEPRESSION: ICD-10-CM

## 2022-04-04 DIAGNOSIS — F32.A DEPRESSION, UNSPECIFIED DEPRESSION TYPE: ICD-10-CM

## 2022-04-04 DIAGNOSIS — Z00.00 ROUTINE ADULT HEALTH MAINTENANCE: Primary | ICD-10-CM

## 2022-04-04 DIAGNOSIS — F17.210 CIGARETTE NICOTINE DEPENDENCE WITHOUT COMPLICATION: ICD-10-CM

## 2022-04-04 DIAGNOSIS — Z53.20 LUNG CANCER SCREENING DECLINED BY PATIENT: ICD-10-CM

## 2022-04-04 DIAGNOSIS — Z79.899 MEDICATION MANAGEMENT: ICD-10-CM

## 2022-04-04 DIAGNOSIS — F32.A ANXIETY AND DEPRESSION: ICD-10-CM

## 2022-04-04 PROBLEM — K63.5 COLON POLYPS: Status: ACTIVE | Noted: 2022-04-04

## 2022-04-04 PROBLEM — L40.9 PSORIASIS: Status: ACTIVE | Noted: 2022-04-04

## 2022-04-04 PROBLEM — F17.200 NICOTINE DEPENDENCE: Status: ACTIVE | Noted: 2021-03-23

## 2022-04-04 PROCEDURE — 1170F FXNL STATUS ASSESSED: CPT | Performed by: FAMILY MEDICINE

## 2022-04-04 PROCEDURE — 96160 PT-FOCUSED HLTH RISK ASSMT: CPT | Performed by: FAMILY MEDICINE

## 2022-04-04 PROCEDURE — 1159F MED LIST DOCD IN RCRD: CPT | Performed by: FAMILY MEDICINE

## 2022-04-04 PROCEDURE — G0439 PPPS, SUBSEQ VISIT: HCPCS | Performed by: FAMILY MEDICINE

## 2022-04-04 RX ORDER — FLUOXETINE HYDROCHLORIDE 20 MG/1
20 CAPSULE ORAL DAILY
Qty: 90 CAPSULE | Refills: 3 | Status: SHIPPED | OUTPATIENT
Start: 2022-04-04 | End: 2023-04-06 | Stop reason: SDUPTHER

## 2022-04-04 NOTE — PROGRESS NOTES
The ABCs of the Annual Wellness Visit  Initial Medicare Wellness Visit    Subjective   History of Present Illness:  Luz Maria Burnett is a 71 y.o. female who presents for an Initial Medicare Wellness Visit.    The following portions of the patient's history were reviewed and   updated as appropriate:   She  has a past medical history of GERD (gastroesophageal reflux disease) and Psoriasis.  She does not have any pertinent problems on file.  She  has a past surgical history that includes Colonoscopy; Laparoscopic tubal ligation; and Colonoscopy (N/A, 12/9/2021).  Her family history includes Diabetes in her father; Heart disease in her father.  She  reports that she has been smoking cigarettes. She has been smoking about 0.50 packs per day. She has never used smokeless tobacco. She reports that she does not drink alcohol and does not use drugs.  Current Outpatient Medications   Medication Sig Dispense Refill   • FLUoxetine (PROzac) 20 MG capsule TAKE 1 CAPSULE(20 MG) BY MOUTH EVERY DAY IN THE EVENING 90 capsule 0   • loperamide (Imodium A-D) 2 MG tablet Imodium A-D 2 mg oral tablet take 2 tablets by oral route bid   Active     • methotrexate 2.5 MG tablet Take 2.5 mg by mouth 1 (One) Time Per Week.     • omeprazole OTC (PrilOSEC OTC) 20 MG EC tablet Prilosec OTC 20 mg oral tablet,delayed release (DR/EC) take 1 tablet by oral route daily   Active       No current facility-administered medications for this visit.     Current Outpatient Medications on File Prior to Visit   Medication Sig   • FLUoxetine (PROzac) 20 MG capsule TAKE 1 CAPSULE(20 MG) BY MOUTH EVERY DAY IN THE EVENING   • loperamide (Imodium A-D) 2 MG tablet Imodium A-D 2 mg oral tablet take 2 tablets by oral route bid   Active   • methotrexate 2.5 MG tablet Take 2.5 mg by mouth 1 (One) Time Per Week.   • omeprazole OTC (PrilOSEC OTC) 20 MG EC tablet Prilosec OTC 20 mg oral tablet,delayed release (DR/EC) take 1 tablet by oral route daily   Active     No current  facility-administered medications on file prior to visit.     She has No Known Allergies..     Compared to one year ago, the patient feels her physical   health is the same.    Compared to one year ago, the patient feels her mental   health is the same.    Recent Hospitalizations:  She was not admitted to the hospital during the last year.       Current Medical Providers:  Patient Care Team:  Remberto Shah III, MD as PCP - General (Family Medicine)  Albaro April, APRN as Nurse Practitioner (Nurse Practitioner)    Outpatient Medications Prior to Visit   Medication Sig Dispense Refill   • FLUoxetine (PROzac) 20 MG capsule TAKE 1 CAPSULE(20 MG) BY MOUTH EVERY DAY IN THE EVENING 90 capsule 0   • loperamide (Imodium A-D) 2 MG tablet Imodium A-D 2 mg oral tablet take 2 tablets by oral route bid   Active     • methotrexate 2.5 MG tablet Take 2.5 mg by mouth 1 (One) Time Per Week.     • omeprazole OTC (PrilOSEC OTC) 20 MG EC tablet Prilosec OTC 20 mg oral tablet,delayed release (DR/EC) take 1 tablet by oral route daily   Active       No facility-administered medications prior to visit.       No opioid medication identified on active medication list. I have reviewed chart for other potential  high risk medication/s and harmful drug interactions in the elderly.          Aspirin is not on active medication list.  Aspirin use is not indicated based on review of current medical condition/s. Risk of harm outweighs potential benefits.  .    Patient Active Problem List   Diagnosis   • Change in bowel habits   • Diarrhea   • Family history of colon cancer   • Anxiety and depression   • Colon polyps   • Encounter for long-term (current) use of other medications   • Lung cancer screening declined by patient   • Nicotine dependence   • Psoriasis     Advance Care Planning  Advance Directive is on file.  ACP discussion was held with the patient during this visit. Patient has an advance directive in EMR which is still valid.     REVIEW  OF SYSTEMS  See progress note    Objective     There were no vitals filed for this visit.  BMI Readings from Last 1 Encounters:   12/09/21 22.13 kg/m²   BMI is within normal parameters. No follow-up required.    Does the patient have evidence of cognitive impairment? No    PHYSICAL EXAM  See progress note         HEALTH RISK ASSESSMENT    Smoking Status:  Social History     Tobacco Use   Smoking Status Current Every Day Smoker   • Packs/day: 0.50   • Types: Cigarettes   Smokeless Tobacco Never Used     Alcohol Consumption:  Social History     Substance and Sexual Activity   Alcohol Use Never     Fall Risk Screen:    [facility]    Depression Screen:   PHQ-2/PHQ-9 Depression Screening 4/4/2022   Little Interest or Pleasure in Doing Things 0-->not at all   Feeling Down, Depressed or Hopeless 0-->not at all   PHQ-9: Brief Depression Severity Measure Score 0       Health Habits and Functional and Cognitive Screening:  Functional & Cognitive Status 4/4/2022   Do you have difficulty preparing food and eating? No   Do you have difficulty bathing yourself, getting dressed or grooming yourself? No   Do you have difficulty using the toilet? No   Do you have difficulty moving around from place to place? No   Do you have trouble with steps or getting out of a bed or a chair? No   Current Diet Well Balanced Diet   Dental Exam Up to date   Eye Exam Up to date   Exercise (times per week) 1 times per week   Current Exercises Include Walking   Do you need help using the phone?  No   Are you deaf or do you have serious difficulty hearing?  No   Do you need help with transportation? No   Do you need help shopping? No   Do you need help preparing meals?  No   Do you need help with housework?  No   Do you need help with laundry? No   Do you need help taking your medications? No   Do you need help managing money? No   Do you ever drive or ride in a car without wearing a seat belt? No   Have you  felt unusual stress, anger or loneliness in the last month? No   Who do you live with? Spouse   If you need help, do you have trouble finding someone available to you? No   Have you been bothered in the last four weeks by sexual problems? No   Do you have difficulty concentrating, remembering or making decisions? No       Age-appropriate Screening Schedule:  Refer to the list below for future screening recommendations based on patient's age, sex and/or medical conditions. Orders for these recommended tests are listed in the plan section. The patient has been provided with a written plan.    Health Maintenance   Topic Date Due   • TDAP/TD VACCINES (1 - Tdap) Never done   • ZOSTER VACCINE (1 of 2) Never done   • MAMMOGRAM  12/06/2021   • DXA SCAN  12/06/2021   • INFLUENZA VACCINE  08/01/2022            CMS Preventative Services Quick Reference  Risk Factors Identified During Encounter  None Identified  The above risks/problems have been discussed with the patient.  Follow up actions/plans if indicated are seen below in the Assessment/Plan Section.  Pertinent information has been shared with the patient in the After Visit Summary.      Follow Up:  No follow-ups on file.     An After Visit Summary and PPPS were made available to the patient.      I spent 45 minutes caring for Luz Maria on this date of service. This time includes time spent by me in the following activities:preparing for the visit, reviewing tests, obtaining and/or reviewing a separately obtained history, performing a medically appropriate examination and/or evaluation , counseling and educating the patient/family/caregiver, ordering medications, tests, or procedures, referring and communicating with other health care professionals , documenting information in the medical record, independently interpreting results and communicating that information with the patient/family/caregiver and care  coordination    _________________________________________________________________________________________________--  Evaluation & Management          ASSESSMENT & PLAN  Diagnoses and all orders for this visit:    1. Anxiety and depression (Primary)    2. Cigarette nicotine dependence without complication    3. Medication management    4. Routine adult health maintenance                  Patient was given instructions and counseling regarding her condition or for health maintenance advice. Please see specific information pulled into the AVS if appropriate.

## 2022-04-04 NOTE — PROGRESS NOTES
"Chief Complaint  Medicare Wellness-subsequent and Med Management    SUBJECTIVE  Luz Maria Burnett presents to [facility] FAM[facility]    71-year-old COPD colon polyps generalized anxiety depression    PAST MEDICAL HISTORY  No Known Allergies     Past Surgical History:   • COLONOSCOPY   • COLONOSCOPY    Procedure: COLONOSCOPY WITH BIOPSY;  Surgeon: Mil Kennedy MD;  Location: [facility] ENDOSCOPY;  Service: General;  Laterality: N/A;  NORMAL COLONOSCOPY   • LAPAROSCOPIC TUBAL LIGATION       Social History     Tobacco Use   • Smoking status: Current Every Day Smoker     Packs/day: 0.50     Years: 50.00     Pack years: 25.00     Types: Cigarettes     Start date: 1970   • Smokeless tobacco: Never Used   Substance Use Topics   • Alcohol use: Never       Family History   Problem Relation Age of Onset   • Heart disease Father    • Diabetes Father    • Malig Hyperthermia Neg Hx         Health Maintenance Due   Topic Date Due   • COVID-19 Vaccine (1) 11/05/1955   • TDAP/TD VACCINES (1 - Tdap) Never done   • ZOSTER VACCINE (1 of 2) Never done        Last Completed Colonoscopy          COLORECTAL CANCER SCREENING (COLONOSCOPY - Every 5 Years) Next due on 12/9/2026 12/09/2021  Surgical Procedure: COLONOSCOPY    12/09/2021  COLONOSCOPY    03/28/2014  Outside Procedure: [place] COLONOSCOPY,DIAGNOSTIC                REVIEW OF SYSTEMS    Cardiovascular no chest pain no palpitations  Respiratory patient declines lung cancer screening due to smoking GI patient had a colonoscopy 2021 every 5 years psych anxiety depression reasonably well controlled    OBJECTIVE  Vitals:    04/04/22 1620   BP: 110/70   Pulse: 82   Temp: 98 °F (36.7 °C)   SpO2: 98%   Weight: 61.2 kg (135 lb)   Height: 167.6 cm (66\")     Body mass index is 21.79 kg/m².    PHYSICAL EXAM    General no distress  HEENT conjunctiva clear TMs negative no oral lesions  Neck no adenopathy no thyromegaly  Respiratory decreased breath sounds no rales no " rhonchi no wheezes  Cardiovascular regular rhythm no murmur  Abdomen soft nontender no abnormal pulsations no hepatosplenomegaly  Skin numerous areas had cryosurgery by her dermatologist no other areas need attention that I can see    ASSESSMENT & PLAN  Diagnoses and all orders for this visit:    1. Routine adult health maintenance (Primary)  -     Lipid Panel; Future  -     Comprehensive Metabolic Panel; Future  -     TSH; Future  -     CBC & Differential; Future    2. Anxiety and depression  -     Lipid Panel; Future  -     Comprehensive Metabolic Panel; Future  -     TSH; Future  -     CBC & Differential; Future    3. Cigarette nicotine dependence without complication  -     Lipid Panel; Future  -     Comprehensive Metabolic Panel; Future  -     TSH; Future  -     CBC & Differential; Future    4. Medication management  -     Lipid Panel; Future  -     Comprehensive Metabolic Panel; Future  -     TSH; Future  -     CBC & Differential; Future    5. Lung cancer screening declined by patient  -     Lipid Panel; Future  -     Comprehensive Metabolic Panel; Future  -     TSH; Future  -     CBC & Differential; Future    6. Depression, unspecified depression type  -     Lipid Panel; Future  -     Comprehensive Metabolic Panel; Future  -     TSH; Future  -     CBC & Differential; Future  -     FLUoxetine (PROzac) 20 MG capsule; Take 1 capsule by mouth Daily.  Dispense: 90 capsule; Refill: 3          COPD needs stop cigarette smoking colon polyps had colonoscopy 2021 mild generalized depression anxiety continue Prozac 20 mg psoriasis takes methotrexate from her dermatologist needs a lipid CMP            Patient was given instructions and counseling regarding her condition or for health maintenance advice. Please see specific information pulled into the AVS if appropriate.

## 2022-04-05 ENCOUNTER — CLINICAL SUPPORT (OUTPATIENT)
Dept: FAMILY MEDICINE CLINIC | Facility: CLINIC | Age: 72
End: 2022-04-05

## 2022-04-05 DIAGNOSIS — Z00.00 ROUTINE ADULT HEALTH MAINTENANCE: ICD-10-CM

## 2022-04-05 DIAGNOSIS — Z53.20 LUNG CANCER SCREENING DECLINED BY PATIENT: ICD-10-CM

## 2022-04-05 DIAGNOSIS — Z79.899 MEDICATION MANAGEMENT: ICD-10-CM

## 2022-04-05 DIAGNOSIS — F41.9 ANXIETY AND DEPRESSION: ICD-10-CM

## 2022-04-05 DIAGNOSIS — F17.210 CIGARETTE NICOTINE DEPENDENCE WITHOUT COMPLICATION: ICD-10-CM

## 2022-04-05 DIAGNOSIS — F32.A DEPRESSION, UNSPECIFIED DEPRESSION TYPE: ICD-10-CM

## 2022-04-05 DIAGNOSIS — F32.A ANXIETY AND DEPRESSION: ICD-10-CM

## 2022-04-05 LAB
ALBUMIN SERPL-MCNC: 4 G/DL (ref 3.5–5.2)
ALBUMIN/GLOB SERPL: 1.6 G/DL
ALP SERPL-CCNC: 146 U/L (ref 39–117)
ALT SERPL W P-5'-P-CCNC: 7 U/L (ref 1–33)
ANION GAP SERPL CALCULATED.3IONS-SCNC: 8.7 MMOL/L (ref 5–15)
AST SERPL-CCNC: 31 U/L (ref 1–32)
BASOPHILS # BLD AUTO: 0.05 10*3/MM3 (ref 0–0.2)
BASOPHILS NFR BLD AUTO: 0.8 % (ref 0–1.5)
BILIRUB SERPL-MCNC: 0.7 MG/DL (ref 0–1.2)
BUN SERPL-MCNC: 12 MG/DL (ref 8–23)
BUN/CREAT SERPL: 15.6 (ref 7–25)
CALCIUM SPEC-SCNC: 9.1 MG/DL (ref 8.6–10.5)
CHLORIDE SERPL-SCNC: 102 MMOL/L (ref 98–107)
CHOLEST SERPL-MCNC: 165 MG/DL (ref 0–200)
CO2 SERPL-SCNC: 27.3 MMOL/L (ref 22–29)
CREAT SERPL-MCNC: 0.77 MG/DL (ref 0.57–1)
DEPRECATED RDW RBC AUTO: 55.6 FL (ref 37–54)
EGFRCR SERPLBLD CKD-EPI 2021: 82.6 ML/MIN/1.73
EOSINOPHIL # BLD AUTO: 0.12 10*3/MM3 (ref 0–0.4)
EOSINOPHIL NFR BLD AUTO: 1.9 % (ref 0.3–6.2)
ERYTHROCYTE [DISTWIDTH] IN BLOOD BY AUTOMATED COUNT: 15.8 % (ref 12.3–15.4)
GLOBULIN UR ELPH-MCNC: 2.5 GM/DL
GLUCOSE SERPL-MCNC: 106 MG/DL (ref 65–99)
HCT VFR BLD AUTO: 38.8 % (ref 34–46.6)
HDLC SERPL-MCNC: 50 MG/DL (ref 40–60)
HGB BLD-MCNC: 13.1 G/DL (ref 12–15.9)
IMM GRANULOCYTES # BLD AUTO: 0.03 10*3/MM3 (ref 0–0.05)
IMM GRANULOCYTES NFR BLD AUTO: 0.5 % (ref 0–0.5)
LDLC SERPL CALC-MCNC: 100 MG/DL (ref 0–100)
LDLC/HDLC SERPL: 1.99 {RATIO}
LYMPHOCYTES # BLD AUTO: 1.5 10*3/MM3 (ref 0.7–3.1)
LYMPHOCYTES NFR BLD AUTO: 23.3 % (ref 19.6–45.3)
MCH RBC QN AUTO: 33.4 PG (ref 26.6–33)
MCHC RBC AUTO-ENTMCNC: 33.8 G/DL (ref 31.5–35.7)
MCV RBC AUTO: 99 FL (ref 79–97)
MONOCYTES # BLD AUTO: 0.39 10*3/MM3 (ref 0.1–0.9)
MONOCYTES NFR BLD AUTO: 6 % (ref 5–12)
NEUTROPHILS NFR BLD AUTO: 4.36 10*3/MM3 (ref 1.7–7)
NEUTROPHILS NFR BLD AUTO: 67.5 % (ref 42.7–76)
NRBC BLD AUTO-RTO: 0 /100 WBC (ref 0–0.2)
PLATELET # BLD AUTO: 256 10*3/MM3 (ref 140–450)
PMV BLD AUTO: 10.6 FL (ref 6–12)
POTASSIUM SERPL-SCNC: 4.5 MMOL/L (ref 3.5–5.2)
PROT SERPL-MCNC: 6.5 G/DL (ref 6–8.5)
RBC # BLD AUTO: 3.92 10*6/MM3 (ref 3.77–5.28)
SODIUM SERPL-SCNC: 138 MMOL/L (ref 136–145)
TRIGL SERPL-MCNC: 77 MG/DL (ref 0–150)
TSH SERPL DL<=0.05 MIU/L-ACNC: 2.78 UIU/ML (ref 0.27–4.2)
VLDLC SERPL-MCNC: 15 MG/DL (ref 5–40)
WBC NRBC COR # BLD: 6.45 10*3/MM3 (ref 3.4–10.8)

## 2022-04-05 PROCEDURE — 84443 ASSAY THYROID STIM HORMONE: CPT | Performed by: FAMILY MEDICINE

## 2022-04-05 PROCEDURE — 36415 COLL VENOUS BLD VENIPUNCTURE: CPT | Performed by: FAMILY MEDICINE

## 2022-04-05 PROCEDURE — 85025 COMPLETE CBC W/AUTO DIFF WBC: CPT | Performed by: FAMILY MEDICINE

## 2022-04-05 PROCEDURE — 80061 LIPID PANEL: CPT | Performed by: FAMILY MEDICINE

## 2022-04-05 PROCEDURE — 80053 COMPREHEN METABOLIC PANEL: CPT | Performed by: FAMILY MEDICINE

## 2023-04-06 ENCOUNTER — OFFICE VISIT (OUTPATIENT)
Dept: FAMILY MEDICINE CLINIC | Facility: CLINIC | Age: 73
End: 2023-04-06
Payer: MEDICARE

## 2023-04-06 VITALS
OXYGEN SATURATION: 95 % | SYSTOLIC BLOOD PRESSURE: 142 MMHG | DIASTOLIC BLOOD PRESSURE: 60 MMHG | WEIGHT: 142 LBS | BODY MASS INDEX: 22.82 KG/M2 | HEART RATE: 97 BPM | TEMPERATURE: 97.2 F | HEIGHT: 66 IN

## 2023-04-06 DIAGNOSIS — F17.210 CIGARETTE NICOTINE DEPENDENCE WITHOUT COMPLICATION: ICD-10-CM

## 2023-04-06 DIAGNOSIS — F32.A ANXIETY AND DEPRESSION: ICD-10-CM

## 2023-04-06 DIAGNOSIS — Z53.20 LUNG CANCER SCREENING DECLINED BY PATIENT: ICD-10-CM

## 2023-04-06 DIAGNOSIS — F41.9 ANXIETY AND DEPRESSION: ICD-10-CM

## 2023-04-06 DIAGNOSIS — F32.A DEPRESSION, UNSPECIFIED DEPRESSION TYPE: ICD-10-CM

## 2023-04-06 DIAGNOSIS — Z53.20 MAMMOGRAM DECLINED: ICD-10-CM

## 2023-04-06 DIAGNOSIS — Z00.00 MEDICARE ANNUAL WELLNESS VISIT, SUBSEQUENT: Primary | ICD-10-CM

## 2023-04-06 RX ORDER — FLUOXETINE HYDROCHLORIDE 20 MG/1
20 CAPSULE ORAL DAILY
Qty: 90 CAPSULE | Refills: 3 | Status: SHIPPED | OUTPATIENT
Start: 2023-04-06

## 2023-04-06 NOTE — PROGRESS NOTES
The ABCs of the Annual Wellness Visit  Initial Medicare Wellness Visit    Subjective   History of Present Illness:  Luz Maria Burnett is a 72 y.o. female who presents for an Initial Medicare Wellness Visit.    The following portions of the patient's history were reviewed and   updated as appropriate:   She  has a past medical history of GERD (gastroesophageal reflux disease) and Psoriasis.  She does not have any pertinent problems on file.  She  has a past surgical history that includes Colonoscopy; Laparoscopic tubal ligation; and Colonoscopy (N/A, 12/9/2021).  Her family history includes Diabetes in her father; Heart disease in her father.  She  reports that she has been smoking cigarettes. She started smoking about 53 years ago. She has a 25.00 pack-year smoking history. She has never used smokeless tobacco. She reports that she does not drink alcohol and does not use drugs.  Current Outpatient Medications   Medication Sig Dispense Refill   • FLUoxetine (PROzac) 20 MG capsule Take 1 capsule by mouth Daily. 90 capsule 3   • loperamide (Imodium A-D) 2 MG tablet Imodium A-D 2 mg oral tablet take 2 tablets by oral route bid   Active     • omeprazole OTC (PrilOSEC OTC) 20 MG EC tablet Prilosec OTC 20 mg oral tablet,delayed release (DR/EC) take 1 tablet by oral route daily   Active     • methotrexate 2.5 MG tablet Take 10 mg by mouth 1 (One) Time Per Week.       No current facility-administered medications for this visit.     Current Outpatient Medications on File Prior to Visit   Medication Sig   • FLUoxetine (PROzac) 20 MG capsule Take 1 capsule by mouth Daily.   • loperamide (Imodium A-D) 2 MG tablet Imodium A-D 2 mg oral tablet take 2 tablets by oral route bid   Active   • omeprazole OTC (PrilOSEC OTC) 20 MG EC tablet Prilosec OTC 20 mg oral tablet,delayed release (DR/EC) take 1 tablet by oral route daily   Active   • methotrexate 2.5 MG tablet Take 10 mg by mouth 1 (One) Time Per Week.     No current  facility-administered medications on file prior to visit.     She has No Known Allergies..     Compared to one year ago, the patient feels her physical   health is worse.    Compared to one year ago, the patient feels her mental   health is the same.    Recent Hospitalizations:  She was not admitted to the hospital during the last year.       Current Medical Providers:  Patient Care Team:  Remberto Shah III, MD as PCP - General (Family Medicine)  Albaro April, APRKATE as Nurse Practitioner (Nurse Practitioner)    Outpatient Medications Prior to Visit   Medication Sig Dispense Refill   • FLUoxetine (PROzac) 20 MG capsule Take 1 capsule by mouth Daily. 90 capsule 3   • loperamide (Imodium A-D) 2 MG tablet Imodium A-D 2 mg oral tablet take 2 tablets by oral route bid   Active     • omeprazole OTC (PrilOSEC OTC) 20 MG EC tablet Prilosec OTC 20 mg oral tablet,delayed release (DR/EC) take 1 tablet by oral route daily   Active     • methotrexate 2.5 MG tablet Take 10 mg by mouth 1 (One) Time Per Week.       No facility-administered medications prior to visit.       No opioid medication identified on active medication list. I have reviewed chart for other potential  high risk medication/s and harmful drug interactions in the elderly.          Aspirin is not on active medication list.  Aspirin use is not indicated based on review of current medical condition/s. Risk of harm outweighs potential benefits.  .    Patient Active Problem List   Diagnosis   • Change in bowel habits   • Diarrhea   • Family history of colon cancer   • Anxiety and depression   • Colon polyps   • Encounter for long-term (current) use of other medications   • Lung cancer screening declined by patient   • Nicotine dependence   • Psoriasis     Advance Care Planning  Advance Directive is on file.  ACP discussion was held with the patient during this visit. Patient has an advance directive in EMR which is still valid.     REVIEW OF SYSTEMS  Respiratory the  "patient does smoke patient politely declines lung cancer screening  GILast colonoscopy 2021 no polyps did have a polyp in 2019 told her she would be slightly safer with 1 more colonoscopy   Musculoskeletal takes methotrexate for her psoriasis gets blood work every 4 months  Objective       Vitals:    04/06/23 1150   BP: 142/60   Pulse: 97   Temp: 97.2 °F (36.2 °C)   SpO2: 95%   Weight: 64.4 kg (142 lb)   Height: 167.6 cm (66\")     BMI Readings from Last 1 Encounters:   04/06/23 22.92 kg/m²   BMI is within normal parameters. No follow-up required.    Does the patient have evidence of cognitive impairment? No    PHYSICAL EXAM blood pressure 138/60  General no distress  Lungs few scattered rales bilaterally  Cardiovascular regular rhythm no murmur  Abdomen soft nontender no hepatosplenomegaly  Skin no cancerous or precancerous lesions      HEALTH RISK ASSESSMENT    Smoking Status:  Social History     Tobacco Use   Smoking Status Every Day   • Packs/day: 0.50   • Years: 50.00   • Pack years: 25.00   • Types: Cigarettes   • Start date: 1970   Smokeless Tobacco Never     Alcohol Consumption:  Social History     Substance and Sexual Activity   Alcohol Use Never     Fall Risk Screen:    CHRISTINA Fall Risk Assessment was completed, and patient is at LOW risk for falls.Assessment completed on:4/6/2023    Depression Screen:   PHQ-2/PHQ-9 Depression Screening 4/6/2023   Little Interest or Pleasure in Doing Things 0-->not at all   Feeling Down, Depressed or Hopeless 0-->not at all   PHQ-9: Brief Depression Severity Measure Score 0       Health Habits and Functional and Cognitive Screening:  Functional & Cognitive Status 4/6/2023   Do you have difficulty preparing food and eating? No   Do you have difficulty bathing yourself, getting dressed or grooming yourself? No   Do you have difficulty using the toilet? No   Do you have difficulty moving around from place to place? No   Do you have trouble with steps or getting out of a bed or " a chair? No   Current Diet Well Balanced Diet        Current Diet Comment -   Dental Exam Up to date   Eye Exam Up to date   Exercise (times per week) 3 times per week   Current Exercises Include Stair Step Machine;Walking   Do you need help using the phone?  No   Are you deaf or do you have serious difficulty hearing?  No   Do you need help with transportation? No   Do you need help shopping? No   Do you need help preparing meals?  No   Do you need help with housework?  No   Do you need help with laundry? No   Do you need help taking your medications? No   Do you need help managing money? No   Do you ever drive or ride in a car without wearing a seat belt? No   Have you felt unusual stress, anger or loneliness in the last month? No   Who do you live with? Spouse   If you need help, do you have trouble finding someone available to you? No   Have you been bothered in the last four weeks by sexual problems? No   Do you have difficulty concentrating, remembering or making decisions? No       Age-appropriate Screening Schedule:  Refer to the list below for future screening recommendations based on patient's age, sex and/or medical conditions. Orders for these recommended tests are listed in the plan section. The patient has been provided with a written plan.    Health Maintenance   Topic Date Due   • TDAP/TD VACCINES (1 - Tdap) Never done   • ZOSTER VACCINE (1 of 2) Never done   • ANNUAL WELLNESS VISIT  04/04/2023   • MAMMOGRAM  04/06/2024 (Originally 12/6/2021)   • DXA SCAN  04/04/2027 (Originally 12/6/2021)   • INFLUENZA VACCINE  08/01/2023   • COLORECTAL CANCER SCREENING  12/09/2026   • HEPATITIS C SCREENING  Completed   • COVID-19 Vaccine  Completed   • Pneumococcal Vaccine 65+  Completed            CMS Preventative Services Quick Reference  Risk Factors Identified During Encounter  None Identified  The above risks/problems have been discussed with the patient.  Follow up actions/plans if indicated are seen below in  the Assessment/Plan Section.  Pertinent information has been shared with the patient in the After Visit Summary.      Follow Up:  No follow-ups on file.     An After Visit Summary and PPPS were made available to the patient.      I spent 45 minutes caring for Luz Maria on this date of service. This time includes time spent by me in the following activities:preparing for the visit, reviewing tests, obtaining and/or reviewing a separately obtained history, performing a medically appropriate examination and/or evaluation , counseling and educating the patient/family/caregiver, ordering medications, tests, or procedures, referring and communicating with other health care professionals , documenting information in the medical record, independently interpreting results and communicating that information with the patient/family/caregiver and care coordination    _________________________________________________________________________________________________--  Evaluation & Management    Chief Complaint  Medicare Wellness-subsequent and Med Management    SUBJECTIVE  Luz Maria Burnett presents to [facility]  Patient 72 years old psoriasis on methotrexate continues to smoke politely declines lung screening mild depression on fluoxetine doing well    PAST MEDICAL HISTORY  No Known Allergies     Past Surgical History:   • COLONOSCOPY   • COLONOSCOPY    Procedure: COLONOSCOPY WITH BIOPSY;  Surgeon: Mil Kennedy MD;  Location: [facility] ENDOSCOPY;  Service: General;  Laterality: N/A;  NORMAL COLONOSCOPY   • LAPAROSCOPIC TUBAL LIGATION       Social History     Tobacco Use   • Smoking status: Every Day     Packs/day: 0.50     Years: 50.00     Pack years: 25.00     Types: Cigarettes     Start date: 1970   • Smokeless tobacco: Never   Substance Use Topics   • Alcohol use: Never       Family History   Problem Relation Age of Onset   • Heart disease Father    • Diabetes Father    • Lillian Isaac  Neg Hx         Health Maintenance Due   Topic Date Due   • TDAP/TD VACCINES (1 - Tdap) Never done   • ZOSTER VACCINE (1 of 2) Never done   • ANNUAL WELLNESS VISIT  04/04/2023      Last Completed Colonoscopy          COLORECTAL CANCER SCREENING (COLONOSCOPY - Every 5 Years) Next due on 12/9/2026 12/09/2021  Surgical Procedure: COLONOSCOPY    12/09/2021  COLONOSCOPY    03/28/2014  Outside Procedure: [facility] COLONOSCOPY,DIAGNOSTIC                  ASSESSMENT & PLAN  Diagnoses and all orders for this visit:    1. Medicare annual wellness visit, subsequent (Primary)    2. Anxiety and depression    3. Cigarette nicotine dependence without complication    4. Depression, unspecified depression type    5. Lung cancer screening declined by patient    6. Mammogram declined      Mild depression doing well continue Prozac continues to smoke rhinitis is followed by dermatology            Patient was given instructions and counseling regarding her condition or for health maintenance advice. Please see specific information pulled into the AVS if appropriate.

## 2024-03-21 DIAGNOSIS — F32.A DEPRESSION, UNSPECIFIED DEPRESSION TYPE: ICD-10-CM

## 2024-03-21 RX ORDER — FLUOXETINE HYDROCHLORIDE 20 MG/1
20 CAPSULE ORAL DAILY
Qty: 90 CAPSULE | Refills: 0 | Status: SHIPPED | OUTPATIENT
Start: 2024-03-21

## 2024-05-01 ENCOUNTER — OFFICE VISIT (OUTPATIENT)
Dept: FAMILY MEDICINE CLINIC | Facility: CLINIC | Age: 74
End: 2024-05-01
Payer: MEDICARE

## 2024-05-01 VITALS
TEMPERATURE: 98.7 F | HEART RATE: 84 BPM | SYSTOLIC BLOOD PRESSURE: 130 MMHG | DIASTOLIC BLOOD PRESSURE: 58 MMHG | WEIGHT: 143 LBS | HEIGHT: 66 IN | OXYGEN SATURATION: 95 % | BODY MASS INDEX: 22.98 KG/M2

## 2024-05-01 DIAGNOSIS — F32.A DEPRESSION, UNSPECIFIED DEPRESSION TYPE: ICD-10-CM

## 2024-05-01 PROBLEM — F41.9 ANXIETY AND DEPRESSION: Status: ACTIVE | Noted: 2021-03-23

## 2024-05-01 PROBLEM — K63.5 COLON POLYPS: Status: ACTIVE | Noted: 2024-05-01

## 2024-05-01 PROBLEM — F17.200 NICOTINE DEPENDENCE: Status: ACTIVE | Noted: 2021-03-23

## 2024-05-01 PROBLEM — L40.9 PSORIASIS: Status: ACTIVE | Noted: 2024-05-01

## 2024-05-01 PROBLEM — Z53.20 LUNG CANCER SCREENING DECLINED BY PATIENT: Status: ACTIVE | Noted: 2021-03-23

## 2024-05-01 PROCEDURE — 99213 OFFICE O/P EST LOW 20 MIN: CPT | Performed by: STUDENT IN AN ORGANIZED HEALTH CARE EDUCATION/TRAINING PROGRAM

## 2024-05-01 PROCEDURE — 1170F FXNL STATUS ASSESSED: CPT | Performed by: STUDENT IN AN ORGANIZED HEALTH CARE EDUCATION/TRAINING PROGRAM

## 2024-05-01 PROCEDURE — 1160F RVW MEDS BY RX/DR IN RCRD: CPT | Performed by: STUDENT IN AN ORGANIZED HEALTH CARE EDUCATION/TRAINING PROGRAM

## 2024-05-01 PROCEDURE — 1159F MED LIST DOCD IN RCRD: CPT | Performed by: STUDENT IN AN ORGANIZED HEALTH CARE EDUCATION/TRAINING PROGRAM

## 2024-05-01 PROCEDURE — G0439 PPPS, SUBSEQ VISIT: HCPCS | Performed by: STUDENT IN AN ORGANIZED HEALTH CARE EDUCATION/TRAINING PROGRAM

## 2024-05-01 PROCEDURE — 96160 PT-FOCUSED HLTH RISK ASSMT: CPT | Performed by: STUDENT IN AN ORGANIZED HEALTH CARE EDUCATION/TRAINING PROGRAM

## 2024-05-01 RX ORDER — FOLIC ACID 1 MG/1
1 TABLET ORAL DAILY
Qty: 90 TABLET | Refills: 4 | Status: SHIPPED | OUTPATIENT
Start: 2024-05-01

## 2024-05-01 RX ORDER — FLUOXETINE HYDROCHLORIDE 20 MG/1
20 CAPSULE ORAL DAILY
Qty: 90 CAPSULE | Refills: 4 | Status: SHIPPED | OUTPATIENT
Start: 2024-05-01

## 2024-05-01 NOTE — PROGRESS NOTES
The ABCs of the Annual Wellness Visit  Subsequent Medicare Wellness Visit    Subjective    Luz Maria Burnett is a 73 y.o. female who presents for a Subsequent Medicare Wellness Visit.    The following portions of the patient's history were reviewed and   updated as appropriate: allergies, current medications, past family history, past medical history, past social history, past surgical history, and problem list.    Compared to one year ago, the patient feels her physical   health is the same.    Compared to one year ago, the patient feels her mental   health is the same.    Recent Hospitalizations:  She was not admitted to the hospital during the last year.       Current Medical Providers:  Patient Care Team:  Panfilo Ward MD as PCP - General (Internal Medicine)  Albaro April, APRN as Nurse Practitioner (Nurse Practitioner)    Outpatient Medications Prior to Visit   Medication Sig Dispense Refill    loperamide (Imodium A-D) 2 MG tablet Imodium A-D 2 mg oral tablet take 2 tablets by oral route bid   Active      methotrexate 2.5 MG tablet Take 4 tablets by mouth 1 (One) Time Per Week.      omeprazole OTC (PrilOSEC OTC) 20 MG EC tablet Prilosec OTC 20 mg oral tablet,delayed release (DR/EC) take 1 tablet by oral route daily   Active      FLUoxetine (PROzac) 20 MG capsule Take 1 capsule by mouth Daily. 90 capsule 0     No facility-administered medications prior to visit.       No opioid medication identified on active medication list. I have reviewed chart for other potential  high risk medication/s and harmful drug interactions in the elderly.        Aspirin is not on active medication list.  Aspirin use is not indicated based on review of current medical condition/s. Risk of harm outweighs potential benefits.  .    Patient Active Problem List   Diagnosis    Change in bowel habits    Diarrhea    Family history of colon cancer    Anxiety and depression    Colon polyps    Encounter for long-term (current) use of  "other medications    Lung cancer screening declined by patient    Nicotine dependence    Psoriasis    Anxiety and depression    Colon polyps    Lung cancer screening declined by patient    Nicotine dependence    Psoriasis     Advance Care Planning   Advance Care Planning     Advance Directive is on file.  ACP discussion was held with the patient during this visit. Patient has an advance directive in EMR which is still valid.      Objective    Vitals:    24 1437   BP: 130/58   Pulse: 84   Temp: 98.7 °F (37.1 °C)   SpO2: 95%   Weight: 64.9 kg (143 lb)   Height: 167.6 cm (66\")     Estimated body mass index is 23.08 kg/m² as calculated from the following:    Height as of this encounter: 167.6 cm (66\").    Weight as of this encounter: 64.9 kg (143 lb).    BMI is within normal parameters. No other follow-up for BMI required.      Does the patient have evidence of cognitive impairment? No          HEALTH RISK ASSESSMENT    Smoking Status:  Social History     Tobacco Use   Smoking Status Every Day    Current packs/day: 0.50    Average packs/day: 0.6 packs/day for 77.1 years (50.0 ttl pk-yrs)    Types: Cigarettes    Start date:    Smokeless Tobacco Never   Tobacco Comments    none     Alcohol Consumption:  Social History     Substance and Sexual Activity   Alcohol Use Not Currently    Comment: no current use     Fall Risk Screen:    CHRISTINA Fall Risk Assessment was completed, and patient is at HIGH risk for falls. Assessment completed on:2024    Depression Screenin/1/2024     2:38 PM   PHQ-2/PHQ-9 Depression Screening   Little Interest or Pleasure in Doing Things 0-->not at all   Feeling Down, Depressed or Hopeless 0-->not at all   PHQ-9: Brief Depression Severity Measure Score 0       Health Habits and Functional and Cognitive Screenin/1/2024     2:38 PM   Functional & Cognitive Status   Do you have difficulty preparing food and eating? No   Do you have difficulty bathing yourself, getting " dressed or grooming yourself? No   Do you have difficulty using the toilet? No   Do you have difficulty moving around from place to place? No   Do you have trouble with steps or getting out of a bed or a chair? No   Current Diet Well Balanced Diet   Dental Exam Up to date   Eye Exam Up to date   Exercise (times per week) 3 times per week   Current Exercises Include Stair Step Machine;Walking   Do you need help using the phone?  No   Are you deaf or do you have serious difficulty hearing?  No   Do you need help to go to places out of walking distance? No   Do you need help shopping? No   Do you need help preparing meals?  No   Do you need help with housework?  No   Do you need help with laundry? No   Do you need help taking your medications? No   Do you need help managing money? No   Do you ever drive or ride in a car without wearing a seat belt? No   Have you felt unusual stress, anger or loneliness in the last month? No   Who do you live with? Spouse   If you need help, do you have trouble finding someone available to you? No   Have you been bothered in the last four weeks by sexual problems? No   Do you have difficulty concentrating, remembering or making decisions? No       Age-appropriate Screening Schedule:  Refer to the list below for future screening recommendations based on patient's age, sex and/or medical conditions. Orders for these recommended tests are listed in the plan section. The patient has been provided with a written plan.    Health Maintenance   Topic Date Due    TDAP/TD VACCINES (1 - Tdap) Never done    ZOSTER VACCINE (1 of 2) Never done    RSV Vaccine - Adults (1 - 1-dose 60+ series) Never done    COVID-19 Vaccine (8 - 2023-24 season) 09/01/2023    MAMMOGRAM  05/01/2025 (Originally 12/6/2021)    DXA SCAN  04/04/2027 (Originally 12/6/2021)    INFLUENZA VACCINE  08/01/2024    ANNUAL WELLNESS VISIT  05/01/2025    COLORECTAL CANCER SCREENING  12/09/2026    HEPATITIS C SCREENING  Completed     "Pneumococcal Vaccine 65+  Completed                  CMS Preventative Services Quick Reference  Risk Factors Identified During Encounter  None Identified  The above risks/problems have been discussed with the patient.  Pertinent information has been shared with the patient in the After Visit Summary.  An After Visit Summary and PPPS were made available to the patient.    Follow Up:   Next Medicare Wellness visit to be scheduled in 1 year.       Additional E&M Note during same encounter follows:  Patient has multiple medical problems which are significant and separately identifiable that require additional work above and beyond the Medicare Wellness Visit.      Chief Complaint  Medicare Wellness-subsequent and Establish Care    Subjective        HPI  Luz Maria Burnett is also being seen today for depression, tobacco use.     Patient is good no complaints.     HM  Declined mammogram, colonoscopy, CT chest low dose. Vaccines.          Objective   Vital Signs:  /58   Pulse 84   Temp 98.7 °F (37.1 °C)   Ht 167.6 cm (66\")   Wt 64.9 kg (143 lb)   SpO2 95%   BMI 23.08 kg/m²     Physical Exam  Vitals reviewed.   HENT:      Head: Normocephalic.      Mouth/Throat:      Mouth: Mucous membranes are moist.   Eyes:      Pupils: Pupils are equal, round, and reactive to light.   Cardiovascular:      Rate and Rhythm: Normal rate.   Abdominal:      General: Abdomen is flat.   Musculoskeletal:         General: Normal range of motion.      Cervical back: Normal range of motion.   Skin:     General: Skin is warm.      Capillary Refill: Capillary refill takes less than 2 seconds.   Neurological:      Mental Status: She is alert.                 Assessment and Plan   Diagnoses and all orders for this visit:    1. Depression, unspecified depression type  -     FLUoxetine (PROzac) 20 MG capsule; Take 1 capsule by mouth Daily.  Dispense: 90 capsule; Refill: 4    Other orders  -     folic acid (FOLVITE) 1 MG tablet; Take 1 tablet by " mouth Daily.  Dispense: 90 tablet; Refill: 4           I spent 45 minutes caring for Luz Maria on this date of service. This time includes time spent by me in the following activities:preparing for the visit, reviewing tests, obtaining and/or reviewing a separately obtained history, performing a medically appropriate examination and/or evaluation , counseling and educating the patient/family/caregiver, ordering medications, tests, or procedures, referring and communicating with other health care professionals , documenting information in the medical record, independently interpreting results and communicating that information with the patient/family/caregiver, and care coordination  Follow Up   Return in about 1 year (around 5/1/2025) for Medicare Wellness.  Patient was given instructions and counseling regarding her condition or for health maintenance advice. Please see specific information pulled into the AVS if appropriate.

## 2024-06-27 ENCOUNTER — OFFICE VISIT (OUTPATIENT)
Dept: PODIATRY | Facility: CLINIC | Age: 74
End: 2024-06-27
Payer: MEDICARE

## 2024-06-27 VITALS
WEIGHT: 143 LBS | HEIGHT: 66 IN | DIASTOLIC BLOOD PRESSURE: 74 MMHG | BODY MASS INDEX: 22.98 KG/M2 | OXYGEN SATURATION: 95 % | SYSTOLIC BLOOD PRESSURE: 148 MMHG | TEMPERATURE: 97.5 F | HEART RATE: 87 BPM

## 2024-06-27 DIAGNOSIS — S92.015S CLOSED NONDISPLACED FRACTURE OF BODY OF LEFT CALCANEUS, SEQUELA: Primary | ICD-10-CM

## 2024-06-27 DIAGNOSIS — I87.2 VENOUS INSUFFICIENCY: ICD-10-CM

## 2024-06-28 NOTE — PROGRESS NOTES
[facility] - PODIATRY    Today's Date: 06/28/24    Patient Name: Luz Maria Burnett  MRN: [id number]  CSN: [id number]  PCP: Panfilo Ward MD,  Referring Provider: Referring, Self    SUBJECTIVE     Chief Complaint   Patient presents with    Right Foot - Establish Care, Pain     Fell off ladder in 3/4/2024, about 4 ft  Landed on heel   Did not get seen     Right Ankle - Establish Care, Pain     Fell off ladder in 3/4/2024, about 4 ft  Landed on heel   Ankle pain too      HPI: Luz Maria Burnett, a 73 y.o.female, presents to clinic.    Patient is a 73-year-old female that fell off a ladder a few months ago.  Patient stated initially she had a lot of pain in her heel.  She states she avoided her heel when walking and walked up on her toes.  Over the course the last few months it has improved and her heel pain has gotten better.  She still has some pain in her leg when she is on it for long periods of time and has swelling related to this.    Past Medical History:   Diagnosis Date    Ankle pain, right     Colon polyp 5/1/2000    have had 4 or 5 colonoscopy    Depression 1996    Foot pain, right     GERD (gastroesophageal reflux disease)     Irritable bowel syndrome 1985    Psoriasis      Past Surgical History:   Procedure Laterality Date    COLONOSCOPY      COLONOSCOPY N/A 12/09/2021    Procedure: COLONOSCOPY WITH BIOPSY;  Surgeon: Mil Kennedy MD;  Location: [facility] ENDOSCOPY;  Service: General;  Laterality: N/A;  NORMAL COLONOSCOPY    EYE SURGERY  2010    cataracs    LAPAROSCOPIC TUBAL LIGATION      TUBAL ABDOMINAL LIGATION  1986     Family History   Problem Relation Age of Onset    Heart disease Father     Diabetes Father     Early death Father     Hearing loss Father     Hyperlipidemia Father     Arthritis Mother     Asthma Mother     Cancer Brother     Diabetes Brother         Type II    Cancer Brother     Diabetes Brother         Type II    Hearing loss Brother     Diabetes Brother          Type II    Malig Hyperthermia Neg Hx      Social History     Socioeconomic History    Marital status:    Tobacco Use    Smoking status: Every Day     Current packs/day: 0.50     Average packs/day: 0.6 packs/day for 77.3 years (50.0 ttl pk-yrs)     Types: Cigarettes     Start date: 1970    Smokeless tobacco: Never    Tobacco comments:     none   Vaping Use    Vaping status: Never Used   Substance and Sexual Activity    Alcohol use: Not Currently     Comment: no current use    Drug use: Never    Sexual activity: Yes     Partners: Male     Birth control/protection: Post-menopausal, None     No Known Allergies  Current Outpatient Medications   Medication Sig Dispense Refill    FLUoxetine (PROzac) 20 MG capsule Take 1 capsule by mouth Daily. 90 capsule 4    folic acid (FOLVITE) 1 MG tablet Take 1 tablet by mouth Daily. 90 tablet 4    loperamide (Imodium A-D) 2 MG tablet Imodium A-D 2 mg oral tablet take 2 tablets by oral route bid   Active      methotrexate 2.5 MG tablet Take 4 tablets by mouth 1 (One) Time Per Week.      omeprazole OTC (PrilOSEC OTC) 20 MG EC tablet Prilosec OTC 20 mg oral tablet,delayed release (DR/EC) take 1 tablet by oral route daily   Active       No current facility-administered medications for this visit.     Review of Systems   Constitutional: Negative.    HENT: Negative.     Eyes: Negative.    Respiratory: Negative.     Cardiovascular: Negative.    Gastrointestinal: Negative.    Endocrine: Negative.    Genitourinary: Negative.    Musculoskeletal: Negative.    Skin: Negative.    Allergic/Immunologic: Negative.    Neurological: Negative.    Hematological: Negative.    Psychiatric/Behavioral: Negative.     All other systems reviewed and are negative.      OBJECTIVE     Vitals:    06/27/24 1046   BP: 148/74   Pulse: 87   Temp: 97.5 °F (36.4 °C)   SpO2: 95%       WBC   Date Value Ref Range Status   04/05/2022 6.45 3.40 - 10.80 10*3/mm3 Final     RBC   Date Value Ref Range Status   04/05/2022  3.92 3.77 - 5.28 10*6/mm3 Final     Hemoglobin   Date Value Ref Range Status   04/05/2022 13.1 12.0 - 15.9 g/dL Final     Hematocrit   Date Value Ref Range Status   04/05/2022 38.8 34.0 - 46.6 % Final     MCV   Date Value Ref Range Status   04/05/2022 99.0 (H) 79.0 - 97.0 fL Final     MCH   Date Value Ref Range Status   04/05/2022 33.4 (H) 26.6 - 33.0 pg Final     MCHC   Date Value Ref Range Status   04/05/2022 33.8 31.5 - 35.7 g/dL Final     RDW   Date Value Ref Range Status   04/05/2022 15.8 (H) 12.3 - 15.4 % Final     RDW-SD   Date Value Ref Range Status   04/05/2022 55.6 (H) 37.0 - 54.0 fl Final     MPV   Date Value Ref Range Status   04/05/2022 10.6 6.0 - 12.0 fL Final     Platelets   Date Value Ref Range Status   04/05/2022 256 140 - 450 10*3/mm3 Final     Neutrophil %   Date Value Ref Range Status   04/05/2022 67.5 42.7 - 76.0 % Final     Lymphocyte %   Date Value Ref Range Status   04/05/2022 23.3 19.6 - 45.3 % Final     Monocyte %   Date Value Ref Range Status   04/05/2022 6.0 5.0 - 12.0 % Final     Eosinophil %   Date Value Ref Range Status   04/05/2022 1.9 0.3 - 6.2 % Final     Basophil %   Date Value Ref Range Status   04/05/2022 0.8 0.0 - 1.5 % Final     Immature Grans %   Date Value Ref Range Status   04/05/2022 0.5 0.0 - 0.5 % Final     Neutrophils, Absolute   Date Value Ref Range Status   04/05/2022 4.36 1.70 - 7.00 10*3/mm3 Final     Lymphocytes, Absolute   Date Value Ref Range Status   04/05/2022 1.50 0.70 - 3.10 10*3/mm3 Final     Monocytes, Absolute   Date Value Ref Range Status   04/05/2022 0.39 0.10 - 0.90 10*3/mm3 Final     Eosinophils, Absolute   Date Value Ref Range Status   04/05/2022 0.12 0.00 - 0.40 10*3/mm3 Final     Basophils, Absolute   Date Value Ref Range Status   04/05/2022 0.05 0.00 - 0.20 10*3/mm3 Final     Immature Grans, Absolute   Date Value Ref Range Status   04/05/2022 0.03 0.00 - 0.05 10*3/mm3 Final     nRBC   Date Value Ref Range Status   04/05/2022 0.0 0.0 - 0.2 /100 WBC  Final         Lab Results   Component Value Date    GLUCOSE 106 (H) 04/05/2022    BUN 12 04/05/2022    CREATININE 0.77 04/05/2022    BCR 15.6 04/05/2022    K 4.5 04/05/2022    CO2 27.3 04/05/2022    CALCIUM 9.1 04/05/2022    ALBUMIN 4.00 04/05/2022    LABIL2 1.6 07/15/2019    AST 31 04/05/2022    ALT 7 04/05/2022       Patient seen in no apparent distress.      PHYSICAL EXAM:     Foot/Ankle Exam    GENERAL  Appearance:  appears stated age  Orientation:  AAOx3  Affect:  appropriate  Gait:  unimpaired  Assistance:  independent  Right shoe gear: casual shoe  Left shoe gear: casual shoe    VASCULAR     Right Foot Vascularity   Normal vascular exam    Dorsalis pedis:  2+  Posterior tibial:  2+  Skin temperature:  warm  Edema grading:  None  CFT:  < 3 seconds  Pedal hair growth:  Present  Varicosities:  none     Left Foot Vascularity   Normal vascular exam    Dorsalis pedis:  2+  Posterior tibial:  2+  Skin temperature:  warm  Edema grading:  None  CFT:  < 3 seconds  Pedal hair growth:  Present  Varicosities:  none     NEUROLOGIC     Right Foot Neurologic   Normal sensation    Light touch sensation: normal  Vibratory sensation: normal  Hot/Cold sensation: normal  Protective Sensation using Winfield-Evelia Monofilament:   Sites intact: 10  Sites tested: 10     Left Foot Neurologic   Normal sensation    Light touch sensation: normal  Vibratory sensation: normal  Hot/Cold sensation:  normal  Protective Sensation using Winfield-Evelia Monofilament:   Sites intact: 10  Sites tested: 10    MUSCLE STRENGTH     Right Foot Muscle Strength   Foot dorsiflexion:  4  Foot plantar flexion:  4  Foot inversion:  4  Foot eversion:  4     Left Foot Muscle Strength   Foot dorsiflexion:  4  Foot plantar flexion:  4  Foot inversion:  4  Foot eversion:  4    RANGE OF MOTION     Right Foot Range of Motion   Foot and ankle ROM within normal limits       Left Foot Range of Motion   Foot and ankle ROM within normal limits      DERMATOLOGIC       Right Foot Dermatologic   Skin  Right foot skin is intact.      Left Foot Dermatologic   Skin  Left foot skin is intact.       RADIOLOGY:        No results found.    ASSESSMENT/PLAN     Diagnoses and all orders for this visit:    1. Closed nondisplaced fracture of body of left calcaneus, sequela (Primary)    2. Venous insufficiency        Comprehensive lower extremity examination and evaluation was performed.    Patient to begin stretching exercises and icing in the evening as tolerated. Discussed compression therapy and resting the extremity.  Anti-inflammatory medication to begin taking if okay by PCP.    Weightbearing as tolerated    Return to clinic as needed    Discussed compression socks and stockings to help with venous congestion.    Discussed findings and treatment plan including risks, benefits, and treatment options with patient in detail. Patient agreed with treatment plan.    Medications and allergies reviewed.  Reviewed available lab values along with other pertinent labs.  These were discussed with the patient.    An After Visit Summary was printed and given to the patient at discharge, including (if requested) any available informative/educational handouts regarding diagnosis, treatment, or medications. All questions were answered to patient/family satisfaction. Should symptoms fail to improve or worsen they agree to call or return to clinic or to go to the Emergency Department. Discussed the importance of following up with any needed screening tests/labs/specialist appointments and any requested follow-up recommended by me today. Importance of maintaining follow-up discussed and patient accepts that missed appointments can delay diagnosis and potentially lead to worsening of conditions.    No follow-ups on file., or sooner if acute issues arise.    This document has been electronically signed by Newton Bales DPM on June 28, 2024 07:20 EDT

## 2025-01-01 ENCOUNTER — APPOINTMENT (OUTPATIENT)
Dept: CT IMAGING | Facility: HOSPITAL | Age: 75
DRG: 871 | End: 2025-01-01
Payer: MEDICARE

## 2025-01-01 ENCOUNTER — TELEPHONE (OUTPATIENT)
Dept: FAMILY MEDICINE CLINIC | Facility: CLINIC | Age: 75
End: 2025-01-01

## 2025-01-01 ENCOUNTER — APPOINTMENT (OUTPATIENT)
Dept: GENERAL RADIOLOGY | Facility: HOSPITAL | Age: 75
DRG: 871 | End: 2025-01-01
Payer: MEDICARE

## 2025-01-01 ENCOUNTER — DOCUMENTATION (OUTPATIENT)
Dept: PHARMACY | Facility: HOSPITAL | Age: 75
End: 2025-01-01
Payer: MEDICARE

## 2025-01-01 ENCOUNTER — HOSPITAL ENCOUNTER (INPATIENT)
Facility: HOSPITAL | Age: 75
LOS: 1 days | DRG: 871 | End: 2025-04-10
Attending: EMERGENCY MEDICINE | Admitting: STUDENT IN AN ORGANIZED HEALTH CARE EDUCATION/TRAINING PROGRAM
Payer: MEDICARE

## 2025-01-01 VITALS
RESPIRATION RATE: 18 BRPM | TEMPERATURE: 97.6 F | WEIGHT: 105.6 LBS | HEIGHT: 66 IN | BODY MASS INDEX: 16.97 KG/M2 | OXYGEN SATURATION: 96 % | DIASTOLIC BLOOD PRESSURE: 56 MMHG | SYSTOLIC BLOOD PRESSURE: 102 MMHG | HEART RATE: 104 BPM

## 2025-01-01 DIAGNOSIS — C34.31 MALIGNANT NEOPLASM OF LOWER LOBE, RIGHT BRONCHUS OR LUNG: ICD-10-CM

## 2025-01-01 DIAGNOSIS — Z78.9 DECREASED ACTIVITIES OF DAILY LIVING (ADL): ICD-10-CM

## 2025-01-01 DIAGNOSIS — A41.9 SEPSIS, DUE TO UNSPECIFIED ORGANISM, UNSPECIFIED WHETHER ACUTE ORGAN DYSFUNCTION PRESENT: ICD-10-CM

## 2025-01-01 DIAGNOSIS — J18.9 PNEUMONIA DUE TO INFECTIOUS ORGANISM, UNSPECIFIED LATERALITY, UNSPECIFIED PART OF LUNG: Primary | ICD-10-CM

## 2025-01-01 DIAGNOSIS — R26.2 DIFFICULTY WALKING: ICD-10-CM

## 2025-01-01 LAB
ALBUMIN SERPL-MCNC: 1.8 G/DL (ref 3.5–5.2)
ALBUMIN/GLOB SERPL: 0.4 G/DL
ALP SERPL-CCNC: 121 U/L (ref 39–117)
ALT SERPL W P-5'-P-CCNC: 9 U/L (ref 1–33)
ANION GAP SERPL CALCULATED.3IONS-SCNC: 12.9 MMOL/L (ref 5–15)
ANION GAP SERPL CALCULATED.3IONS-SCNC: 14.3 MMOL/L (ref 5–15)
ANION GAP SERPL CALCULATED.3IONS-SCNC: 16 MMOL/L (ref 5–15)
ANISOCYTOSIS BLD QL: NORMAL
ARTERIAL PATENCY WRIST A: POSITIVE
AST SERPL-CCNC: 21 U/L (ref 1–32)
ATMOSPHERIC PRESS: 748.7 MMHG
BASE EXCESS BLDA CALC-SCNC: -3.8 MMOL/L (ref -2–2)
BASOPHILS # BLD AUTO: 0.17 10*3/MM3 (ref 0–0.2)
BASOPHILS # BLD AUTO: 0.18 10*3/MM3 (ref 0–0.2)
BASOPHILS NFR BLD AUTO: 0.7 % (ref 0–1.5)
BASOPHILS NFR BLD AUTO: 0.8 % (ref 0–1.5)
BDY SITE: ABNORMAL
BILIRUB SERPL-MCNC: 1.2 MG/DL (ref 0–1.2)
BUN SERPL-MCNC: 29 MG/DL (ref 8–23)
BUN SERPL-MCNC: 30 MG/DL (ref 8–23)
BUN SERPL-MCNC: 40 MG/DL (ref 8–23)
BUN/CREAT SERPL: 29.1 (ref 7–25)
BUN/CREAT SERPL: 31.2 (ref 7–25)
BUN/CREAT SERPL: 44.4 (ref 7–25)
BURR CELLS BLD QL SMEAR: ABNORMAL
BURR CELLS BLD QL SMEAR: NORMAL
CA-I BLDA-SCNC: 1.07 MMOL/L (ref 1.13–1.32)
CALCIUM SPEC-SCNC: 7.5 MG/DL (ref 8.6–10.5)
CALCIUM SPEC-SCNC: 8.3 MG/DL (ref 8.6–10.5)
CALCIUM SPEC-SCNC: 8.3 MG/DL (ref 8.6–10.5)
CHLORIDE BLDA-SCNC: 103 MMOL/L (ref 98–107)
CHLORIDE SERPL-SCNC: 106 MMOL/L (ref 98–107)
CHLORIDE SERPL-SCNC: 98 MMOL/L (ref 98–107)
CHLORIDE SERPL-SCNC: 99 MMOL/L (ref 98–107)
CO2 SERPL-SCNC: 16 MMOL/L (ref 22–29)
CO2 SERPL-SCNC: 19.1 MMOL/L (ref 22–29)
CO2 SERPL-SCNC: 21.7 MMOL/L (ref 22–29)
CREAT SERPL-MCNC: 0.9 MG/DL (ref 0.57–1)
CREAT SERPL-MCNC: 0.93 MG/DL (ref 0.57–1)
CREAT SERPL-MCNC: 1.03 MG/DL (ref 0.57–1)
D-LACTATE SERPL-SCNC: 2 MMOL/L (ref 0.5–2)
D-LACTATE SERPL-SCNC: 2.2 MMOL/L (ref 0.5–2)
D-LACTATE SERPL-SCNC: 3.1 MMOL/L
D-LACTATE SERPL-SCNC: 3.6 MMOL/L (ref 0.5–2)
D-LACTATE SERPL-SCNC: 4.6 MMOL/L (ref 0.5–2)
DEPRECATED RDW RBC AUTO: 50.4 FL (ref 37–54)
DEPRECATED RDW RBC AUTO: 51 FL (ref 37–54)
DEPRECATED RDW RBC AUTO: 51.7 FL (ref 37–54)
EGFRCR SERPLBLD CKD-EPI 2021: 57.2 ML/MIN/1.73
EGFRCR SERPLBLD CKD-EPI 2021: 64.6 ML/MIN/1.73
EGFRCR SERPLBLD CKD-EPI 2021: 67.2 ML/MIN/1.73
EOSINOPHIL # BLD AUTO: 0.01 10*3/MM3 (ref 0–0.4)
EOSINOPHIL # BLD AUTO: 0.05 10*3/MM3 (ref 0–0.4)
EOSINOPHIL # BLD MANUAL: 0.33 10*3/MM3 (ref 0–0.4)
EOSINOPHIL NFR BLD AUTO: 0 % (ref 0.3–6.2)
EOSINOPHIL NFR BLD AUTO: 0.2 % (ref 0.3–6.2)
EOSINOPHIL NFR BLD MANUAL: 1 % (ref 0.3–6.2)
ERYTHROCYTE [DISTWIDTH] IN BLOOD BY AUTOMATED COUNT: 15.7 % (ref 12.3–15.4)
ERYTHROCYTE [DISTWIDTH] IN BLOOD BY AUTOMATED COUNT: 15.9 % (ref 12.3–15.4)
ERYTHROCYTE [DISTWIDTH] IN BLOOD BY AUTOMATED COUNT: 16 % (ref 12.3–15.4)
GAS FLOW AIRWAY: 2 LPM
GEN 5 1HR TROPONIN T REFLEX: 13 NG/L
GLOBULIN UR ELPH-MCNC: 4.4 GM/DL
GLUCOSE BLDC GLUCOMTR-MCNC: 137 MG/DL (ref 65–99)
GLUCOSE SERPL-MCNC: 136 MG/DL (ref 65–99)
GLUCOSE SERPL-MCNC: 190 MG/DL (ref 65–99)
GLUCOSE SERPL-MCNC: 202 MG/DL (ref 65–99)
HCO3 BLDA-SCNC: 19.7 MMOL/L (ref 22–26)
HCT VFR BLD AUTO: 33.9 % (ref 34–46.6)
HCT VFR BLD AUTO: 34.2 % (ref 34–46.6)
HCT VFR BLD AUTO: 38.1 % (ref 34–46.6)
HCT VFR BLD CALC: 31 % (ref 38–51)
HEMODILUTION: NO
HGB BLD-MCNC: 10.5 G/DL (ref 12–15.9)
HGB BLD-MCNC: 10.8 G/DL (ref 12–15.9)
HGB BLD-MCNC: 11.9 G/DL (ref 12–15.9)
HGB BLDA-MCNC: 10.4 G/DL (ref 12–18)
HOLD SPECIMEN: NORMAL
IMM GRANULOCYTES # BLD AUTO: 0.44 10*3/MM3 (ref 0–0.05)
IMM GRANULOCYTES # BLD AUTO: 0.6 10*3/MM3 (ref 0–0.05)
IMM GRANULOCYTES NFR BLD AUTO: 2 % (ref 0–0.5)
IMM GRANULOCYTES NFR BLD AUTO: 2.4 % (ref 0–0.5)
INHALED O2 CONCENTRATION: 28 %
LYMPHOCYTES # BLD AUTO: 0.76 10*3/MM3 (ref 0.7–3.1)
LYMPHOCYTES # BLD AUTO: 0.89 10*3/MM3 (ref 0.7–3.1)
LYMPHOCYTES # BLD MANUAL: 2.31 10*3/MM3 (ref 0.7–3.1)
LYMPHOCYTES NFR BLD AUTO: 3.1 % (ref 19.6–45.3)
LYMPHOCYTES NFR BLD AUTO: 4 % (ref 19.6–45.3)
LYMPHOCYTES NFR BLD MANUAL: 2 % (ref 5–12)
MAGNESIUM SERPL-MCNC: 2.1 MG/DL (ref 1.6–2.4)
MCH RBC QN AUTO: 27.7 PG (ref 26.6–33)
MCH RBC QN AUTO: 27.7 PG (ref 26.6–33)
MCH RBC QN AUTO: 28.1 PG (ref 26.6–33)
MCHC RBC AUTO-ENTMCNC: 31 G/DL (ref 31.5–35.7)
MCHC RBC AUTO-ENTMCNC: 31.2 G/DL (ref 31.5–35.7)
MCHC RBC AUTO-ENTMCNC: 31.6 G/DL (ref 31.5–35.7)
MCV RBC AUTO: 88.8 FL (ref 79–97)
MCV RBC AUTO: 88.8 FL (ref 79–97)
MCV RBC AUTO: 89.4 FL (ref 79–97)
MODALITY: ABNORMAL
MONOCYTES # BLD AUTO: 1.05 10*3/MM3 (ref 0.1–0.9)
MONOCYTES # BLD AUTO: 1.13 10*3/MM3 (ref 0.1–0.9)
MONOCYTES # BLD: 0.66 10*3/MM3 (ref 0.1–0.9)
MONOCYTES NFR BLD AUTO: 4.2 % (ref 5–12)
MONOCYTES NFR BLD AUTO: 5.1 % (ref 5–12)
MRSA DNA SPEC QL NAA+PROBE: NORMAL
NEUTROPHILS # BLD AUTO: 29.76 10*3/MM3 (ref 1.7–7)
NEUTROPHILS NFR BLD AUTO: 19.48 10*3/MM3 (ref 1.7–7)
NEUTROPHILS NFR BLD AUTO: 22.19 10*3/MM3 (ref 1.7–7)
NEUTROPHILS NFR BLD AUTO: 88.1 % (ref 42.7–76)
NEUTROPHILS NFR BLD AUTO: 89.4 % (ref 42.7–76)
NEUTROPHILS NFR BLD MANUAL: 78 % (ref 42.7–76)
NEUTS BAND NFR BLD MANUAL: 12 % (ref 0–5)
NEUTS VAC BLD QL SMEAR: ABNORMAL
NRBC BLD AUTO-RTO: 0 /100 WBC (ref 0–0.2)
NRBC BLD AUTO-RTO: 0 /100 WBC (ref 0–0.2)
NT-PROBNP SERPL-MCNC: 2135 PG/ML (ref 0–900)
PCO2 BLDA: 29.7 MM HG (ref 35–45)
PH BLDA: 7.43 PH UNITS (ref 7.35–7.45)
PHOSPHATE SERPL-MCNC: 3.8 MG/DL (ref 2.5–4.5)
PLATELET # BLD AUTO: 161 10*3/MM3 (ref 140–450)
PLATELET # BLD AUTO: 168 10*3/MM3 (ref 140–450)
PLATELET # BLD AUTO: 213 10*3/MM3 (ref 140–450)
PMV BLD AUTO: 10 FL (ref 6–12)
PMV BLD AUTO: 10.5 FL (ref 6–12)
PMV BLD AUTO: 9.9 FL (ref 6–12)
PO2 BLD: 286 MM[HG] (ref 0–500)
PO2 BLDA: 80.2 MM HG (ref 80–100)
POIKILOCYTOSIS BLD QL SMEAR: NORMAL
POLYCHROMASIA BLD QL SMEAR: ABNORMAL
POTASSIUM BLDA-SCNC: 3.2 MMOL/L (ref 3.5–5)
POTASSIUM SERPL-SCNC: 3.5 MMOL/L (ref 3.5–5.2)
POTASSIUM SERPL-SCNC: 4 MMOL/L (ref 3.5–5.2)
POTASSIUM SERPL-SCNC: 4.4 MMOL/L (ref 3.5–5.2)
PROCALCITONIN SERPL-MCNC: 1.07 NG/ML (ref 0–0.25)
PROT SERPL-MCNC: 6.2 G/DL (ref 6–8.5)
QT INTERVAL: 326 MS
QTC INTERVAL: 465 MS
RBC # BLD AUTO: 3.79 10*6/MM3 (ref 3.77–5.28)
RBC # BLD AUTO: 3.85 10*6/MM3 (ref 3.77–5.28)
RBC # BLD AUTO: 4.29 10*6/MM3 (ref 3.77–5.28)
SAO2 % BLDCOA: 96.3 % (ref 95–99)
SCAN SLIDE: NORMAL
SMALL PLATELETS BLD QL SMEAR: ADEQUATE
SMALL PLATELETS BLD QL SMEAR: NORMAL
SODIUM BLD-SCNC: 137 MMOL/L (ref 131–143)
SODIUM SERPL-SCNC: 131 MMOL/L (ref 136–145)
SODIUM SERPL-SCNC: 134 MMOL/L (ref 136–145)
SODIUM SERPL-SCNC: 138 MMOL/L (ref 136–145)
TROPONIN T % DELTA: -32
TROPONIN T NUMERIC DELTA: -6 NG/L
TROPONIN T SERPL HS-MCNC: 19 NG/L
VARIANT LYMPHS NFR BLD MANUAL: 7 % (ref 19.6–45.3)
WBC MORPH BLD: NORMAL
WBC NRBC COR # BLD AUTO: 22.12 10*3/MM3 (ref 3.4–10.8)
WBC NRBC COR # BLD AUTO: 24.83 10*3/MM3 (ref 3.4–10.8)
WBC NRBC COR # BLD AUTO: 33.07 10*3/MM3 (ref 3.4–10.8)
WHOLE BLOOD HOLD COAG: NORMAL
WHOLE BLOOD HOLD COAG: NORMAL
WHOLE BLOOD HOLD SPECIMEN: NORMAL

## 2025-01-01 PROCEDURE — 99239 HOSP IP/OBS DSCHRG MGMT >30: CPT | Performed by: INTERNAL MEDICINE

## 2025-01-01 PROCEDURE — 77290 THER RAD SIMULAJ FIELD CPLX: CPT | Performed by: RADIOLOGY

## 2025-01-01 PROCEDURE — 25010000002 CEFEPIME PER 500 MG: Performed by: EMERGENCY MEDICINE

## 2025-01-01 PROCEDURE — 84484 ASSAY OF TROPONIN QUANT: CPT | Performed by: EMERGENCY MEDICINE

## 2025-01-01 PROCEDURE — 25010000002 HYDROMORPHONE 1 MG/ML SOLUTION: Performed by: INTERNAL MEDICINE

## 2025-01-01 PROCEDURE — 25810000003 SODIUM CHLORIDE 0.9 % SOLUTION: Performed by: EMERGENCY MEDICINE

## 2025-01-01 PROCEDURE — 94799 UNLISTED PULMONARY SVC/PX: CPT

## 2025-01-01 PROCEDURE — 94640 AIRWAY INHALATION TREATMENT: CPT

## 2025-01-01 PROCEDURE — 77334 RADIATION TREATMENT AID(S): CPT | Performed by: RADIOLOGY

## 2025-01-01 PROCEDURE — 87040 BLOOD CULTURE FOR BACTERIA: CPT | Performed by: EMERGENCY MEDICINE

## 2025-01-01 PROCEDURE — 97165 OT EVAL LOW COMPLEX 30 MIN: CPT

## 2025-01-01 PROCEDURE — 71275 CT ANGIOGRAPHY CHEST: CPT

## 2025-01-01 PROCEDURE — 85025 COMPLETE CBC W/AUTO DIFF WBC: CPT | Performed by: INTERNAL MEDICINE

## 2025-01-01 PROCEDURE — 83605 ASSAY OF LACTIC ACID: CPT

## 2025-01-01 PROCEDURE — 82330 ASSAY OF CALCIUM: CPT

## 2025-01-01 PROCEDURE — 25810000003 SODIUM CHLORIDE 0.9 % SOLUTION 250 ML FLEX CONT: Performed by: EMERGENCY MEDICINE

## 2025-01-01 PROCEDURE — 25010000002 ENOXAPARIN PER 10 MG: Performed by: STUDENT IN AN ORGANIZED HEALTH CARE EDUCATION/TRAINING PROGRAM

## 2025-01-01 PROCEDURE — 77332 RADIATION TREATMENT AID(S): CPT | Performed by: RADIOLOGY

## 2025-01-01 PROCEDURE — 85007 BL SMEAR W/DIFF WBC COUNT: CPT | Performed by: EMERGENCY MEDICINE

## 2025-01-01 PROCEDURE — 77295 3-D RADIOTHERAPY PLAN: CPT | Performed by: RADIOLOGY

## 2025-01-01 PROCEDURE — 99222 1ST HOSP IP/OBS MODERATE 55: CPT | Performed by: STUDENT IN AN ORGANIZED HEALTH CARE EDUCATION/TRAINING PROGRAM

## 2025-01-01 PROCEDURE — 77300 RADIATION THERAPY DOSE PLAN: CPT | Performed by: RADIOLOGY

## 2025-01-01 PROCEDURE — 87641 MR-STAPH DNA AMP PROBE: CPT | Performed by: INTERNAL MEDICINE

## 2025-01-01 PROCEDURE — 25010000002 CEFEPIME PER 500 MG: Performed by: STUDENT IN AN ORGANIZED HEALTH CARE EDUCATION/TRAINING PROGRAM

## 2025-01-01 PROCEDURE — 77263 THER RADIOLOGY TX PLNG CPLX: CPT | Performed by: RADIOLOGY

## 2025-01-01 PROCEDURE — 25510000001 IOPAMIDOL PER 1 ML: Performed by: EMERGENCY MEDICINE

## 2025-01-01 PROCEDURE — 36415 COLL VENOUS BLD VENIPUNCTURE: CPT

## 2025-01-01 PROCEDURE — 85007 BL SMEAR W/DIFF WBC COUNT: CPT | Performed by: INTERNAL MEDICINE

## 2025-01-01 PROCEDURE — 97161 PT EVAL LOW COMPLEX 20 MIN: CPT

## 2025-01-01 PROCEDURE — 94664 DEMO&/EVAL PT USE INHALER: CPT

## 2025-01-01 PROCEDURE — 93005 ELECTROCARDIOGRAM TRACING: CPT | Performed by: EMERGENCY MEDICINE

## 2025-01-01 PROCEDURE — 80051 ELECTROLYTE PANEL: CPT

## 2025-01-01 PROCEDURE — 25010000002 VANCOMYCIN 1 G RECONSTITUTED SOLUTION 1 EACH VIAL: Performed by: EMERGENCY MEDICINE

## 2025-01-01 PROCEDURE — 71045 X-RAY EXAM CHEST 1 VIEW: CPT

## 2025-01-01 PROCEDURE — 82948 REAGENT STRIP/BLOOD GLUCOSE: CPT

## 2025-01-01 PROCEDURE — 80053 COMPREHEN METABOLIC PANEL: CPT | Performed by: EMERGENCY MEDICINE

## 2025-01-01 PROCEDURE — 80048 BASIC METABOLIC PNL TOTAL CA: CPT | Performed by: STUDENT IN AN ORGANIZED HEALTH CARE EDUCATION/TRAINING PROGRAM

## 2025-01-01 PROCEDURE — 25810000003 SODIUM CHLORIDE 0.9 % SOLUTION 250 ML FLEX CONT: Performed by: INTERNAL MEDICINE

## 2025-01-01 PROCEDURE — 25010000002 DIAZEPAM PER 5 MG: Performed by: INTERNAL MEDICINE

## 2025-01-01 PROCEDURE — 99223 1ST HOSP IP/OBS HIGH 75: CPT | Performed by: INTERNAL MEDICINE

## 2025-01-01 PROCEDURE — 83735 ASSAY OF MAGNESIUM: CPT | Performed by: INTERNAL MEDICINE

## 2025-01-01 PROCEDURE — 99232 SBSQ HOSP IP/OBS MODERATE 35: CPT | Performed by: INTERNAL MEDICINE

## 2025-01-01 PROCEDURE — 25010000002 VANCOMYCIN 1 G RECONSTITUTED SOLUTION 1 EACH VIAL: Performed by: INTERNAL MEDICINE

## 2025-01-01 PROCEDURE — 99222 1ST HOSP IP/OBS MODERATE 55: CPT | Performed by: INTERNAL MEDICINE

## 2025-01-01 PROCEDURE — 84100 ASSAY OF PHOSPHORUS: CPT | Performed by: INTERNAL MEDICINE

## 2025-01-01 PROCEDURE — 83880 ASSAY OF NATRIURETIC PEPTIDE: CPT | Performed by: EMERGENCY MEDICINE

## 2025-01-01 PROCEDURE — 25010000002 HYALURONIDASE (HUMAN) 150 UNIT/ML SOLUTION 1 ML VIAL: Performed by: EMERGENCY MEDICINE

## 2025-01-01 PROCEDURE — 85025 COMPLETE CBC W/AUTO DIFF WBC: CPT | Performed by: STUDENT IN AN ORGANIZED HEALTH CARE EDUCATION/TRAINING PROGRAM

## 2025-01-01 PROCEDURE — 99291 CRITICAL CARE FIRST HOUR: CPT

## 2025-01-01 PROCEDURE — 84145 PROCALCITONIN (PCT): CPT | Performed by: INTERNAL MEDICINE

## 2025-01-01 PROCEDURE — 85025 COMPLETE CBC W/AUTO DIFF WBC: CPT | Performed by: EMERGENCY MEDICINE

## 2025-01-01 PROCEDURE — 82803 BLOOD GASES ANY COMBINATION: CPT

## 2025-01-01 PROCEDURE — 80048 BASIC METABOLIC PNL TOTAL CA: CPT | Performed by: INTERNAL MEDICINE

## 2025-01-01 PROCEDURE — 83605 ASSAY OF LACTIC ACID: CPT | Performed by: EMERGENCY MEDICINE

## 2025-01-01 PROCEDURE — 25810000003 SODIUM CHLORIDE 0.9 % SOLUTION: Performed by: STUDENT IN AN ORGANIZED HEALTH CARE EDUCATION/TRAINING PROGRAM

## 2025-01-01 PROCEDURE — 36600 WITHDRAWAL OF ARTERIAL BLOOD: CPT

## 2025-01-01 RX ORDER — LORAZEPAM 2 MG/ML
1 CONCENTRATE ORAL
Status: DISCONTINUED | OUTPATIENT
Start: 2025-01-01 | End: 2025-04-11 | Stop reason: HOSPADM

## 2025-01-01 RX ORDER — BISACODYL 5 MG/1
5 TABLET, DELAYED RELEASE ORAL DAILY PRN
Status: DISCONTINUED | OUTPATIENT
Start: 2025-01-01 | End: 2025-04-11 | Stop reason: HOSPADM

## 2025-01-01 RX ORDER — HYDROXYZINE HYDROCHLORIDE 25 MG/1
25 TABLET, FILM COATED ORAL 3 TIMES DAILY PRN
Status: DISCONTINUED | OUTPATIENT
Start: 2025-01-01 | End: 2025-04-11 | Stop reason: HOSPADM

## 2025-01-01 RX ORDER — AMOXICILLIN AND CLAVULANATE POTASSIUM 400; 57 MG/5ML; MG/5ML
800 POWDER, FOR SUSPENSION ORAL EVERY 12 HOURS SCHEDULED
Status: DISCONTINUED | OUTPATIENT
Start: 2025-01-01 | End: 2025-04-11 | Stop reason: HOSPADM

## 2025-01-01 RX ORDER — SODIUM CHLORIDE 0.9 % (FLUSH) 0.9 %
10 SYRINGE (ML) INJECTION AS NEEDED
Status: DISCONTINUED | OUTPATIENT
Start: 2025-01-01 | End: 2025-04-11 | Stop reason: HOSPADM

## 2025-01-01 RX ORDER — ALUMINA, MAGNESIA, AND SIMETHICONE 2400; 2400; 240 MG/30ML; MG/30ML; MG/30ML
15 SUSPENSION ORAL EVERY 6 HOURS PRN
Status: DISCONTINUED | OUTPATIENT
Start: 2025-01-01 | End: 2025-04-11 | Stop reason: HOSPADM

## 2025-01-01 RX ORDER — DIAZEPAM 10 MG/2ML
5 INJECTION, SOLUTION INTRAMUSCULAR; INTRAVENOUS EVERY 4 HOURS PRN
Status: DISCONTINUED | OUTPATIENT
Start: 2025-01-01 | End: 2025-04-11 | Stop reason: HOSPADM

## 2025-01-01 RX ORDER — MORPHINE SULFATE 20 MG/ML
5 SOLUTION ORAL
Qty: 30 ML | Refills: 0 | Status: SHIPPED | OUTPATIENT
Start: 2025-01-01 | End: 2025-04-12

## 2025-01-01 RX ORDER — BISACODYL 10 MG
10 SUPPOSITORY, RECTAL RECTAL DAILY PRN
Status: DISCONTINUED | OUTPATIENT
Start: 2025-01-01 | End: 2025-04-11 | Stop reason: HOSPADM

## 2025-01-01 RX ORDER — NITROGLYCERIN 0.4 MG/1
0.4 TABLET SUBLINGUAL
Status: DISCONTINUED | OUTPATIENT
Start: 2025-01-01 | End: 2025-01-01

## 2025-01-01 RX ORDER — CHOLESTYRAMINE 4 G/9G
1 POWDER, FOR SUSPENSION ORAL 2 TIMES DAILY PRN
Status: DISCONTINUED | OUTPATIENT
Start: 2025-01-01 | End: 2025-01-01

## 2025-01-01 RX ORDER — SODIUM CHLORIDE 9 MG/ML
40 INJECTION, SOLUTION INTRAVENOUS AS NEEDED
Status: DISCONTINUED | OUTPATIENT
Start: 2025-01-01 | End: 2025-04-11 | Stop reason: HOSPADM

## 2025-01-01 RX ORDER — PANTOPRAZOLE SODIUM 40 MG/1
40 TABLET, DELAYED RELEASE ORAL
Status: DISCONTINUED | OUTPATIENT
Start: 2025-01-01 | End: 2025-04-11 | Stop reason: HOSPADM

## 2025-01-01 RX ORDER — LORAZEPAM 2 MG/ML
0.5 CONCENTRATE ORAL
Qty: 30 ML | Refills: 0 | Status: SHIPPED | OUTPATIENT
Start: 2025-01-01 | End: 2025-04-12

## 2025-01-01 RX ORDER — GUAIFENESIN 600 MG/1
1200 TABLET, EXTENDED RELEASE ORAL 2 TIMES DAILY
Status: DISCONTINUED | OUTPATIENT
Start: 2025-01-01 | End: 2025-04-11 | Stop reason: HOSPADM

## 2025-01-01 RX ORDER — IOPAMIDOL 755 MG/ML
100 INJECTION, SOLUTION INTRAVASCULAR
Status: COMPLETED | OUTPATIENT
Start: 2025-01-01 | End: 2025-01-01

## 2025-01-01 RX ORDER — AMOXICILLIN 250 MG
2 CAPSULE ORAL 2 TIMES DAILY PRN
Status: DISCONTINUED | OUTPATIENT
Start: 2025-01-01 | End: 2025-04-11 | Stop reason: HOSPADM

## 2025-01-01 RX ORDER — HYDROCODONE POLISTIREX AND CHLORPHENIRAMINE POLISTIREX 10; 8 MG/5ML; MG/5ML
2.5 SUSPENSION, EXTENDED RELEASE ORAL 3 TIMES DAILY PRN
Refills: 0 | Status: DISCONTINUED | OUTPATIENT
Start: 2025-01-01 | End: 2025-04-11 | Stop reason: HOSPADM

## 2025-01-01 RX ORDER — LIDOCAINE 5 G/100G
1 CREAM RECTAL; TOPICAL 3 TIMES DAILY PRN
Status: DISCONTINUED | OUTPATIENT
Start: 2025-01-01 | End: 2025-04-11 | Stop reason: HOSPADM

## 2025-01-01 RX ORDER — SCOPOLAMINE 1 MG/3D
1 PATCH, EXTENDED RELEASE TRANSDERMAL
Status: DISCONTINUED | OUTPATIENT
Start: 2025-01-01 | End: 2025-04-11 | Stop reason: HOSPADM

## 2025-01-01 RX ORDER — LOPERAMIDE HYDROCHLORIDE 2 MG/1
4 CAPSULE ORAL ONCE
Status: DISCONTINUED | OUTPATIENT
Start: 2025-01-01 | End: 2025-04-11 | Stop reason: HOSPADM

## 2025-01-01 RX ORDER — AMOXICILLIN AND CLAVULANATE POTASSIUM 400; 57 MG/5ML; MG/5ML
800 POWDER, FOR SUSPENSION ORAL EVERY 12 HOURS SCHEDULED
Qty: 100 ML | Refills: 0 | Status: SHIPPED | OUTPATIENT
Start: 2025-01-01 | End: 2025-04-12

## 2025-01-01 RX ORDER — LIDOCAINE 4 G/G
1 PATCH TOPICAL DAILY PRN
Status: DISCONTINUED | OUTPATIENT
Start: 2025-01-01 | End: 2025-04-11 | Stop reason: HOSPADM

## 2025-01-01 RX ORDER — HYDROCODONE POLISTIREX AND CHLORPHENIRAMINE POLISTIREX 10; 8 MG/5ML; MG/5ML
2.5 SUSPENSION, EXTENDED RELEASE ORAL 3 TIMES DAILY PRN
Qty: 30 ML | Refills: 0 | Status: SHIPPED | OUTPATIENT
Start: 2025-01-01 | End: 2025-04-12

## 2025-01-01 RX ORDER — FLUORIDE TOOTHPASTE
15 TOOTHPASTE DENTAL AS NEEDED
Qty: 237 ML | Refills: 0 | Status: SHIPPED | OUTPATIENT
Start: 2025-01-01 | End: 2025-04-12

## 2025-01-01 RX ORDER — MORPHINE SULFATE 20 MG/ML
5 SOLUTION ORAL
Refills: 0 | Status: DISCONTINUED | OUTPATIENT
Start: 2025-01-01 | End: 2025-04-11 | Stop reason: HOSPADM

## 2025-01-01 RX ORDER — ONDANSETRON 2 MG/ML
4 INJECTION INTRAMUSCULAR; INTRAVENOUS EVERY 4 HOURS PRN
Status: DISCONTINUED | OUTPATIENT
Start: 2025-01-01 | End: 2025-04-11 | Stop reason: HOSPADM

## 2025-01-01 RX ORDER — ENOXAPARIN SODIUM 100 MG/ML
30 INJECTION SUBCUTANEOUS DAILY
Status: DISCONTINUED | OUTPATIENT
Start: 2025-01-01 | End: 2025-01-01

## 2025-01-01 RX ORDER — SODIUM BICARBONATE 650 MG/1
650 TABLET ORAL 3 TIMES DAILY
Status: DISCONTINUED | OUTPATIENT
Start: 2025-01-01 | End: 2025-01-01

## 2025-01-01 RX ORDER — ALBUTEROL SULFATE 0.83 MG/ML
2.5 SOLUTION RESPIRATORY (INHALATION) EVERY 6 HOURS PRN
Status: DISCONTINUED | OUTPATIENT
Start: 2025-01-01 | End: 2025-04-11 | Stop reason: HOSPADM

## 2025-01-01 RX ORDER — ACETAMINOPHEN 325 MG/1
650 TABLET ORAL EVERY 6 HOURS PRN
Status: DISCONTINUED | OUTPATIENT
Start: 2025-01-01 | End: 2025-04-11 | Stop reason: HOSPADM

## 2025-01-01 RX ORDER — ONDANSETRON 4 MG/1
4 TABLET, ORALLY DISINTEGRATING ORAL EVERY 8 HOURS PRN
Qty: 16 TABLET | Refills: 0 | Status: SHIPPED | OUTPATIENT
Start: 2025-01-01 | End: 2025-04-12

## 2025-01-01 RX ORDER — ONDANSETRON 2 MG/ML
4 INJECTION INTRAMUSCULAR; INTRAVENOUS EVERY 6 HOURS PRN
Status: DISCONTINUED | OUTPATIENT
Start: 2025-01-01 | End: 2025-04-11 | Stop reason: HOSPADM

## 2025-01-01 RX ORDER — POLYETHYLENE GLYCOL 3350 17 G/17G
17 POWDER, FOR SOLUTION ORAL DAILY PRN
Status: DISCONTINUED | OUTPATIENT
Start: 2025-01-01 | End: 2025-04-11 | Stop reason: HOSPADM

## 2025-01-01 RX ORDER — ATROPINE SULFATE 10 MG/ML
2 SOLUTION/ DROPS OPHTHALMIC
Qty: 15 ML | Refills: 0 | Status: SHIPPED | OUTPATIENT
Start: 2025-01-01 | End: 2025-04-12

## 2025-01-01 RX ORDER — GUAIFENESIN 600 MG/1
1200 TABLET, EXTENDED RELEASE ORAL 2 TIMES DAILY PRN
Start: 2025-01-01 | End: 2025-04-12

## 2025-01-01 RX ORDER — NICOTINE 21 MG/24HR
1 PATCH, TRANSDERMAL 24 HOURS TRANSDERMAL DAILY PRN
Status: DISCONTINUED | OUTPATIENT
Start: 2025-01-01 | End: 2025-04-11 | Stop reason: HOSPADM

## 2025-01-01 RX ORDER — ATROPINE SULFATE 10 MG/ML
2 SOLUTION/ DROPS OPHTHALMIC
Status: DISCONTINUED | OUTPATIENT
Start: 2025-01-01 | End: 2025-04-11 | Stop reason: HOSPADM

## 2025-01-01 RX ORDER — MORPHINE SULFATE 20 MG/ML
10 SOLUTION ORAL
Refills: 0 | Status: DISCONTINUED | OUTPATIENT
Start: 2025-01-01 | End: 2025-04-11 | Stop reason: HOSPADM

## 2025-01-01 RX ORDER — SODIUM CHLORIDE 0.9 % (FLUSH) 0.9 %
10 SYRINGE (ML) INJECTION EVERY 12 HOURS SCHEDULED
Status: DISCONTINUED | OUTPATIENT
Start: 2025-01-01 | End: 2025-01-01

## 2025-01-01 RX ORDER — POTASSIUM CHLORIDE 750 MG/1
40 CAPSULE, EXTENDED RELEASE ORAL ONCE
Status: COMPLETED | OUTPATIENT
Start: 2025-01-01 | End: 2025-01-01

## 2025-01-01 RX ORDER — LOPERAMIDE HYDROCHLORIDE 2 MG/1
2 CAPSULE ORAL 4 TIMES DAILY PRN
Status: DISCONTINUED | OUTPATIENT
Start: 2025-01-01 | End: 2025-04-11 | Stop reason: HOSPADM

## 2025-01-01 RX ORDER — ARFORMOTEROL TARTRATE 15 UG/2ML
15 SOLUTION RESPIRATORY (INHALATION)
Status: DISCONTINUED | OUTPATIENT
Start: 2025-01-01 | End: 2025-04-11 | Stop reason: HOSPADM

## 2025-01-01 RX ORDER — BUDESONIDE 0.5 MG/2ML
0.5 INHALANT ORAL
Status: DISCONTINUED | OUTPATIENT
Start: 2025-01-01 | End: 2025-04-11 | Stop reason: HOSPADM

## 2025-01-01 RX ORDER — HALOPERIDOL 2 MG/ML
2 SOLUTION ORAL
Status: DISCONTINUED | OUTPATIENT
Start: 2025-01-01 | End: 2025-04-11 | Stop reason: HOSPADM

## 2025-01-01 RX ADMIN — SODIUM CHLORIDE 1000 ML: 9 INJECTION, SOLUTION INTRAVENOUS at 02:27

## 2025-01-01 RX ADMIN — ARFORMOTEROL TARTRATE 15 MCG: 15 SOLUTION RESPIRATORY (INHALATION) at 22:42

## 2025-01-01 RX ADMIN — ATROPINE SULFATE 2 DROP: 10 SOLUTION/ DROPS OPHTHALMIC at 18:11

## 2025-01-01 RX ADMIN — CHOLESTYRAMINE 1 PACKET: 4 POWDER, FOR SUSPENSION ORAL at 11:34

## 2025-01-01 RX ADMIN — ATROPINE SULFATE 2 DROP: 10 SOLUTION/ DROPS OPHTHALMIC at 17:37

## 2025-01-01 RX ADMIN — ATROPINE SULFATE 2 DROP: 10 SOLUTION/ DROPS OPHTHALMIC at 15:36

## 2025-01-01 RX ADMIN — DIAZEPAM 5 MG: 5 INJECTION INTRAMUSCULAR; INTRAVENOUS at 15:28

## 2025-01-01 RX ADMIN — SODIUM BICARBONATE 650 MG TABLET 650 MG: at 10:11

## 2025-01-01 RX ADMIN — ARFORMOTEROL TARTRATE 15 MCG: 15 SOLUTION RESPIRATORY (INHALATION) at 09:00

## 2025-01-01 RX ADMIN — BUDESONIDE 0.5 MG: 0.5 SUSPENSION RESPIRATORY (INHALATION) at 09:57

## 2025-01-01 RX ADMIN — GUAIFENESIN 1200 MG: 600 TABLET ORAL at 22:01

## 2025-01-01 RX ADMIN — PANTOPRAZOLE SODIUM 40 MG: 40 TABLET, DELAYED RELEASE ORAL at 05:12

## 2025-01-01 RX ADMIN — CEFEPIME 1000 MG: 1 INJECTION, POWDER, FOR SOLUTION INTRAMUSCULAR; INTRAVENOUS at 01:02

## 2025-01-01 RX ADMIN — HYDROMORPHONE HYDROCHLORIDE 0.5 MG: 1 INJECTION, SOLUTION INTRAMUSCULAR; INTRAVENOUS; SUBCUTANEOUS at 17:37

## 2025-01-01 RX ADMIN — ATROPINE SULFATE 2 DROP: 10 SOLUTION/ DROPS OPHTHALMIC at 16:44

## 2025-01-01 RX ADMIN — BUDESONIDE 0.5 MG: 0.5 SUSPENSION RESPIRATORY (INHALATION) at 09:00

## 2025-01-01 RX ADMIN — VANCOMYCIN HYDROCHLORIDE 1000 MG: 1 INJECTION, POWDER, LYOPHILIZED, FOR SOLUTION INTRAVENOUS at 23:46

## 2025-01-01 RX ADMIN — MORPHINE SULFATE 5 MG: 10 SOLUTION ORAL at 15:27

## 2025-01-01 RX ADMIN — VANCOMYCIN HYDROCHLORIDE 1000 MG: 1 INJECTION, POWDER, LYOPHILIZED, FOR SOLUTION INTRAVENOUS at 09:42

## 2025-01-01 RX ADMIN — ARFORMOTEROL TARTRATE 15 MCG: 15 SOLUTION RESPIRATORY (INHALATION) at 09:56

## 2025-01-01 RX ADMIN — ENOXAPARIN SODIUM 30 MG: 100 INJECTION SUBCUTANEOUS at 09:41

## 2025-01-01 RX ADMIN — SODIUM CHLORIDE 2000 MG: 9 INJECTION, SOLUTION INTRAVENOUS at 22:39

## 2025-01-01 RX ADMIN — SCOLOPAMINE TRANSDERMAL SYSTEM 1 PATCH: 1 PATCH, EXTENDED RELEASE TRANSDERMAL at 17:07

## 2025-01-01 RX ADMIN — SODIUM CHLORIDE 1413 ML: 9 INJECTION, SOLUTION INTRAVENOUS at 22:19

## 2025-01-01 RX ADMIN — PANTOPRAZOLE SODIUM 40 MG: 40 TABLET, DELAYED RELEASE ORAL at 06:34

## 2025-01-01 RX ADMIN — VANCOMYCIN HYDROCHLORIDE 1000 MG: 1 INJECTION, POWDER, LYOPHILIZED, FOR SOLUTION INTRAVENOUS at 10:10

## 2025-01-01 RX ADMIN — ENOXAPARIN SODIUM 30 MG: 100 INJECTION SUBCUTANEOUS at 10:11

## 2025-01-01 RX ADMIN — GUAIFENESIN 1200 MG: 600 TABLET ORAL at 09:41

## 2025-01-01 RX ADMIN — Medication 10 ML: at 09:42

## 2025-01-01 RX ADMIN — Medication 10 ML: at 22:01

## 2025-01-01 RX ADMIN — POTASSIUM CHLORIDE 40 MEQ: 750 CAPSULE, EXTENDED RELEASE ORAL at 10:11

## 2025-01-01 RX ADMIN — CEFEPIME 1000 MG: 1 INJECTION, POWDER, FOR SOLUTION INTRAMUSCULAR; INTRAVENOUS at 12:51

## 2025-01-01 RX ADMIN — MORPHINE SULFATE 10 MG: 10 SOLUTION ORAL at 16:43

## 2025-01-01 RX ADMIN — IOPAMIDOL 100 ML: 755 INJECTION, SOLUTION INTRAVENOUS at 00:08

## 2025-01-01 RX ADMIN — HYALURONIDASE (HUMAN RECOMBINANT) 150 UNITS: 150 INJECTION, SOLUTION SUBCUTANEOUS at 03:25

## 2025-01-01 RX ADMIN — BUDESONIDE 0.5 MG: 0.5 SUSPENSION RESPIRATORY (INHALATION) at 22:43

## 2025-01-01 RX ADMIN — HYDROMORPHONE HYDROCHLORIDE 0.5 MG: 1 INJECTION, SOLUTION INTRAMUSCULAR; INTRAVENOUS; SUBCUTANEOUS at 16:33

## 2025-02-26 ENCOUNTER — OFFICE VISIT (OUTPATIENT)
Dept: FAMILY MEDICINE CLINIC | Facility: CLINIC | Age: 75
End: 2025-02-26
Payer: MEDICARE

## 2025-02-26 VITALS
HEART RATE: 100 BPM | OXYGEN SATURATION: 99 % | WEIGHT: 135 LBS | HEIGHT: 66 IN | SYSTOLIC BLOOD PRESSURE: 112 MMHG | DIASTOLIC BLOOD PRESSURE: 64 MMHG | BODY MASS INDEX: 21.69 KG/M2 | TEMPERATURE: 97.5 F

## 2025-02-26 DIAGNOSIS — J44.1 COPD WITH EXACERBATION: ICD-10-CM

## 2025-02-26 DIAGNOSIS — E78.5 HYPERLIPIDEMIA, UNSPECIFIED HYPERLIPIDEMIA TYPE: ICD-10-CM

## 2025-02-26 DIAGNOSIS — R07.9 CHEST PAIN, UNSPECIFIED TYPE: ICD-10-CM

## 2025-02-26 DIAGNOSIS — F17.219 CIGARETTE NICOTINE DEPENDENCE WITH NICOTINE-INDUCED DISORDER: Primary | ICD-10-CM

## 2025-02-26 RX ORDER — AZITHROMYCIN 250 MG/1
TABLET, FILM COATED ORAL
Qty: 6 TABLET | Refills: 0 | Status: SHIPPED | OUTPATIENT
Start: 2025-02-26

## 2025-02-26 RX ORDER — ROSUVASTATIN CALCIUM 10 MG/1
10 TABLET, COATED ORAL DAILY
Qty: 90 TABLET | Refills: 3 | Status: SHIPPED | OUTPATIENT
Start: 2025-02-26

## 2025-02-26 NOTE — ASSESSMENT & PLAN NOTE
Tobacco use is improving with lifestyle modifications.  Smoking cessation counseling was provided.  Tobacco use will be reassessed in 3 months.                                          Orders:     CT Chest Low Dose Cancer Screening WO; Future    Complete PFT - Pre & Post Bronchodilator; Future    Ambulatory Referral to Pulmonology

## 2025-02-26 NOTE — PROGRESS NOTES
"Chief Complaint  Cough (Lingering dry cough and weakness no fever,maybe secondary infection that I can't /shake. Had it for several weeks /)    Subjective      Luz Maria Burnett is a 74 y.o. female who presents to [facility]     Patient comes with cc of cough. She is due for CT as she was a heavy smoker for many years. Refers SOB cardiopulmonary differentials. Pt need a PFTs to eval for possible COPD and might benefit from pulmonary evaluation. Pt refers occasional chest discomfort. Ecg obtained. Pt is high risk for cardiovascular diseases. She might need further work up as well.     Discussion about CT low dose and importance of lung cancer screening. Order placed.     Objective   Vital Signs:   Vitals:    02/26/25 1346   BP: 112/64   Pulse: 100   Temp: 97.5 °F (36.4 °C)   TempSrc: Temporal   SpO2: 99%   Weight: 61.2 kg (135 lb)   Height: 167.6 cm (65.98\")     Body mass index is 21.8 kg/m².    Wt Readings from Last 3 Encounters:   02/26/25 61.2 kg (135 lb)   06/27/24 64.9 kg (143 lb)   05/01/24 64.9 kg (143 lb)     BP Readings from Last 3 Encounters:   02/26/25 112/64   06/27/24 148/74   05/01/24 130/58       Health Maintenance   Topic Date Due    TDAP/TD VACCINES (1 - Tdap) Never done    ZOSTER VACCINE (1 of 2) Never done    LUNG CANCER SCREENING  Never done    ANNUAL WELLNESS VISIT  05/01/2025    COVID-19 Vaccine (8 - 2024-25 season) 02/28/2025 (Originally 9/1/2024)    INFLUENZA VACCINE  03/31/2025 (Originally 7/1/2024)    MAMMOGRAM  05/01/2025 (Originally 12/6/2021)    DXA SCAN  04/04/2027 (Originally 12/6/2021)    COLORECTAL CANCER SCREENING  12/09/2026    HEPATITIS C SCREENING  Completed    Pneumococcal Vaccine 50+  Completed       Physical Exam         ECG 12 Lead    Date/Time: 2/26/2025 4:36 PM  Performed by: Panfilo Ward MD    Authorized by: Panfilo Ward MD  Comparison: not compared with previous ECG   Previous ECG: no previous ECG available  Rhythm: sinus " rhythm  Rate: normal  QRS axis: indeterminate    Clinical impression: non-specific ECG           Assessment & Plan  Cigarette nicotine dependence with nicotine-induced disorder  Tobacco use is improving with lifestyle modifications.  Smoking cessation counseling was provided.  Tobacco use will be reassessed in 3 months.                                          Orders:     CT Chest Low Dose Cancer Screening WO; Future    Complete PFT - Pre & Post Bronchodilator; Future    Ambulatory Referral to Pulmonology    COPD with exacerbation  Likely COPD   Obtain PFTs.     Plan:  PFTs       Patient Treatment Goals:   symptom prevention, minimizing limitation in activity, prevention of exacerbations and use of ER/inpatient care, maintenance of optimal pulmonary function, and minimization of adverse effects of treatment    Followup in 6 months.    Orders:    Complete PFT - Pre & Post Bronchodilator; Future    Ambulatory Referral to Pulmonology    ECG 12 Lead    Chest pain, unspecified type    Orders:    ECG 12 Lead    Ambulatory Referral to Cardiology    Hyperlipidemia, unspecified hyperlipidemia type   Lipid abnormalities are stable    Plan:  Continue same medication/s without change.      Discussed medication dosage, use, side effects, and goals of treatment in detail.    Counseled patient on lifestyle modifications to help control hyperlipidemia.     Patient Treatment Goals:   LDL goal is less than 70    Followup in 6 months.    Orders:    rosuvastatin (Crestor) 10 MG tablet; Take 1 tablet by mouth Daily.         BMI is within normal parameters. No other follow-up for BMI required.     I spent 20 minutes caring for Luz Maria on this date of service. This time includes time spent by me in the following activities:preparing for the visit, reviewing tests, obtaining and/or reviewing a separately obtained history, performing a medically appropriate examination and/or evaluation, counseling and educating the patient/family/caregiver,  ordering medications, tests, or procedures, referring and communicating with other health care professionals, documenting information in the medical record, independently interpreting results and communicating that information with the patient/family/caregiver, care coordination.    FOLLOW UP  No follow-ups on file.  Patient was given instructions and counseling regarding her condition or for health maintenance advice. Please see specific information pulled into the AVS if appropriate.       Panfilo Jarrell MD  02/26/25  16:39 EST    CURRENT & DISCONTINUED MEDICATIONS  Current Outpatient Medications   Medication Instructions    azithromycin (ZITHROMAX) 250 MG tablet Take 2 tablets PO the first day, then 1 tablet PO daily for 4 days.    FLUoxetine (PROZAC) 20 mg, Oral, Daily    folic acid (FOLVITE) 1 mg, Oral, Daily    loperamide (Imodium A-D) 2 MG tablet Imodium A-D 2 mg oral tablet take 2 tablets by oral route bid   Active    methotrexate 10 mg, Weekly    omeprazole OTC (PrilOSEC OTC) 20 MG EC tablet Prilosec OTC 20 mg oral tablet,delayed release (DR/EC) take 1 tablet by oral route daily   Active    rosuvastatin (CRESTOR) 10 mg, Oral, Daily       There are no discontinued medications.

## 2025-03-12 ENCOUNTER — HOSPITAL ENCOUNTER (OUTPATIENT)
Dept: CT IMAGING | Facility: HOSPITAL | Age: 75
Discharge: HOME | End: 2025-03-12
Admitting: STUDENT IN AN ORGANIZED HEALTH CARE EDUCATION/TRAINING PROGRAM
Payer: MEDICARE

## 2025-03-12 DIAGNOSIS — F17.219 CIGARETTE NICOTINE DEPENDENCE WITH NICOTINE-INDUCED DISORDER: ICD-10-CM

## 2025-03-12 PROCEDURE — 71271 CT THORAX LUNG CANCER SCR C-: CPT

## 2025-03-17 ENCOUNTER — TELEPHONE (OUTPATIENT)
Dept: FAMILY MEDICINE CLINIC | Facility: CLINIC | Age: 75
End: 2025-03-17
Payer: MEDICARE

## 2025-03-18 ENCOUNTER — OFFICE VISIT (OUTPATIENT)
Dept: PULMONOLOGY | Facility: CLINIC | Age: 75
End: 2025-03-18
Payer: MEDICARE

## 2025-03-18 VITALS
HEIGHT: 66 IN | SYSTOLIC BLOOD PRESSURE: 126 MMHG | TEMPERATURE: 98 F | WEIGHT: 100.6 LBS | RESPIRATION RATE: 18 BRPM | BODY MASS INDEX: 16.17 KG/M2 | OXYGEN SATURATION: 97 % | DIASTOLIC BLOOD PRESSURE: 78 MMHG | HEART RATE: 104 BPM

## 2025-03-18 DIAGNOSIS — J43.9 PULMONARY EMPHYSEMA, UNSPECIFIED EMPHYSEMA TYPE: ICD-10-CM

## 2025-03-18 DIAGNOSIS — Z72.0 TOBACCO ABUSE: ICD-10-CM

## 2025-03-18 DIAGNOSIS — R63.4 WEIGHT LOSS: ICD-10-CM

## 2025-03-18 DIAGNOSIS — R91.8 LUNG MASS: Primary | ICD-10-CM

## 2025-03-18 DIAGNOSIS — R91.8 MULTIPLE PULMONARY NODULES: ICD-10-CM

## 2025-03-18 DIAGNOSIS — Z71.6 ENCOUNTER FOR SMOKING CESSATION COUNSELING: ICD-10-CM

## 2025-03-18 DIAGNOSIS — R06.09 DYSPNEA ON EXERTION: ICD-10-CM

## 2025-03-18 RX ORDER — ALBUTEROL SULFATE 90 UG/1
2 INHALANT RESPIRATORY (INHALATION) EVERY 4 HOURS PRN
Qty: 18 G | Refills: 11 | Status: SHIPPED | OUTPATIENT
Start: 2025-03-18

## 2025-03-18 NOTE — H&P (VIEW-ONLY)
Primary Care Provider  Panfilo Ward MD   Referring Provider  Panfilo Ward, *      Patient Complaint  Establish Care (New Patient), Lung Nodule, and Shortness of Breath    Subjective          Luz Maria Burnett presents to [facility]    History of Presenting Illness  Luz Maria Burnett is a 74 y.o. female patient with lung mass, lung nodules, emphysema, and tobacco use ongoing, here to establish care.    History of Present Illness    Luz Maria Burnett presents today as a new patient, referred to our clinic by her primary care provider Dr. Panfilo Ward for lung mass and nodules.  She had her LDCT done 3/12/2025 which showed a 10 cm cavitary mass in the right lower lobe.  There were two additional 1 cm nodules in the left lung.  Patient denies using any antibiotics or steroids for her lungs recently, denies any fevers or chills.  She has not had any recent ER visits or hospitalizations for her breathing.  Her primary respiratory complaint is shortness of breath with exertion, able to continue doing her normal activities as long as she paces herself and takes frequent breaks.  She also notes a lot of fatigue.  She recently quit smoking 1 month ago, 50 pack years.  Patient denies any hemoptysis, swollen lymph nodes, or night sweats.  She has lost about 40 pounds unintentionally in the last year, attributes some of this to stress as her  has been in poor health.  She has a family history of COPD/emphysema.  Overall, patient is doing well and has no additional concerns at this time.  Patient is able to perform ADLs without difficulty.  I have personally reviewed patient's past family, social, medical and surgical histories and agree with their findings.    Pulmonary Meds  None    Pulmonary Equipment  None      Family History   Problem Relation Age of Onset    Heart disease Father     Diabetes Father     Early death Father     Hearing loss Father      Hyperlipidemia Father     Arthritis Mother     Asthma Mother     Cancer Brother     Diabetes Brother         Type II    Cancer Brother     Diabetes Brother         Type II    Hearing loss Brother     Diabetes Brother         Type II    Malig Hyperthermia Neg Hx         Social History     Socioeconomic History    Marital status:    Tobacco Use    Smoking status: Former     Current packs/day: 0.00     Average packs/day: 0.6 packs/day for 77.8 years (50.3 ttl pk-yrs)     Types: Cigarettes     Start date:      Quit date:      Years since quittin.2    Smokeless tobacco: Never    Tobacco comments:     none   Vaping Use    Vaping status: Never Used   Substance and Sexual Activity    Alcohol use: Not Currently     Comment: no current use    Drug use: Never    Sexual activity: Yes     Partners: Male     Birth control/protection: Post-menopausal, None        Past Medical History:   Diagnosis Date    Ankle pain, right     Colon polyp 2000    have had 4 or 5 colonoscopy    Depression     Foot pain, right     GERD (gastroesophageal reflux disease)     Irritable bowel syndrome     Psoriasis         Immunization History   Administered Date(s) Administered    COVID-19 (MODERNA) 1st,2nd,3rd Dose Monovalent 2021, 2021, 10/27/2021    COVID-19 (MODERNA) BIVALENT 12+YRS 10/27/2022    COVID-19 (MODERNA) Monovalent Original Booster 2021, 2021, 10/27/2021    Flu Vaccine Quad PF >36MO 10/14/2020    Fluad Quad 65+ 10/08/2020    Fluzone High-Dose 65+yrs 10/27/2022    Pneumococcal Conjugate 13-Valent (PCV13) 08/10/2016    Pneumococcal Polysaccharide (PPSV23) 2018       No Known Allergies       Current Outpatient Medications:     loperamide (Imodium A-D) 2 MG tablet, Imodium A-D 2 mg oral tablet take 2 tablets by oral route bid   Active, Disp: , Rfl:     albuterol sulfate  (90 Base) MCG/ACT inhaler, Inhale 2 puffs Every 4 (Four) Hours As Needed for Wheezing or Shortness of  Air., Disp: 18 g, Rfl: 11    FLUoxetine (PROzac) 20 MG capsule, Take 1 capsule by mouth Daily. (Patient not taking: Reported on 3/18/2025), Disp: 90 capsule, Rfl: 4    folic acid (FOLVITE) 1 MG tablet, Take 1 tablet by mouth Daily. (Patient not taking: Reported on 3/18/2025), Disp: 90 tablet, Rfl: 4    methotrexate 2.5 MG tablet, Take 4 tablets by mouth 1 (One) Time Per Week. (Patient not taking: Reported on 3/18/2025), Disp: , Rfl:     omeprazole OTC (PrilOSEC OTC) 20 MG EC tablet, Prilosec OTC 20 mg oral tablet,delayed release (DR/EC) take 1 tablet by oral route daily   Active (Patient not taking: Reported on 3/18/2025), Disp: , Rfl:     rosuvastatin (Crestor) 10 MG tablet, Take 1 tablet by mouth Daily. (Patient not taking: Reported on 3/18/2025), Disp: 90 tablet, Rfl: 3       Objective     Vitals:    03/18/25 1301   BP: 126/78   Pulse: 104   Resp: 18   Temp: 98 °F (36.7 °C)   SpO2: 97%       Physical Exam  Constitutional:       General: She is not in acute distress.     Appearance: Normal appearance. She is normal weight. She is not ill-appearing.   HENT:      Right Ear: Tympanic membrane and ear canal normal.      Left Ear: Tympanic membrane and ear canal normal.      Nose: Nose normal.      Mouth/Throat:      Mouth: Mucous membranes are moist.      Pharynx: Oropharynx is clear.   Cardiovascular:      Rate and Rhythm: Normal rate and regular rhythm.      Pulses: Normal pulses.      Heart sounds: Normal heart sounds.   Pulmonary:      Effort: Pulmonary effort is normal. No respiratory distress.      Breath sounds: Normal breath sounds. No stridor. No wheezing, rhonchi or rales.   Musculoskeletal:         General: No swelling. Normal range of motion.      Cervical back: Normal range of motion and neck supple.      Right lower leg: No edema.      Left lower leg: No edema.   Skin:     General: Skin is warm and dry.   Neurological:      General: No focal deficit present.      Mental Status: She is alert and oriented  to person, place, and time.      Motor: No weakness.   Psychiatric:         Mood and Affect: Mood normal.         Behavior: Behavior normal.         Result Review :   I have personally reviewed patient's labs and images.     Results    -Patient is enrolled in annual lung cancer screening program, managed by PCP.  LDCT 3/12/2025 showed a 10 cm mass in the right lower lobe with central necrosis or cavitation.  There was also mediastinal adenopathy noted.  This could represent bronchogenic malignancy or possible granulomatous disease.  There is also 1 cm cavitary lesion in the left upper lobe and 1 cm solid lesion in the left lower lobe.  Marked emphysema also noted.       Assessment and Plan        Diagnoses and all orders for this visit:    1. Lung mass (Primary)  -     Case Request; Standing  -     Follow Anesthesia Guidlines / Standing Orders; Standing  -     Follow Anesthesia Guidelines / Protocol; Future  -     Case Request    2. Multiple pulmonary nodules    3. Pulmonary emphysema, unspecified emphysema type    4. Dyspnea on exertion  -     albuterol sulfate  (90 Base) MCG/ACT inhaler; Inhale 2 puffs Every 4 (Four) Hours As Needed for Wheezing or Shortness of Air.  Dispense: 18 g; Refill: 11    5. Weight loss    6. Tobacco abuse    7. Encounter for smoking cessation counseling       Assessment & Plan    -Patient has PFTs scheduled for 5/21/2025  -PET scan scheduled for 3/26/2025, will keep this appointment for now in case biopsy positive for malignancy, as we will need it for staging  -Patient's imaging reviewed by Dr. Best, recommends EBUS bronchoscopy for tissue sampling.  Discussed risks and benefits with patient today, she would like to proceed.  We will get her scheduled.  -Begin using albuterol inhaler 2 puffs every 4 hours as needed, may add/adjust inhalers based on PFT results  -Follow-up after biopsy    Smoking history reviewed.  Encouraged patient to continue with recent cessation.  I spent 5  minutes today counseling patient on the risks of smoking, including throat cancer, lung cancer, COPD, heart disease and death.  Also discussed the benefits of quitting.  Vaccination status: Patient reports she is up-to-date with her flu, pneumonia, and Covid vaccines.  Patient is advised to continue to follow CDC recommendations such as social distancing wearing a mask and washing hands for at least 20 seconds.  Medications personally reviewed.    Follow Up   No follow-ups on file.  Patient was given instructions and counseling regarding her condition or for health maintenance advice. Please see specific information pulled into the AVS if appropriate.

## 2025-03-18 NOTE — PROGRESS NOTES
Primary Care Provider  Panfilo Ward MD   Referring Provider  Panfilo Ward, *      Patient Complaint  Establish Care (New Patient), Lung Nodule, and Shortness of Breath    Subjective          Luz Maria Burnett presents to [facility] PULMONARY & CRITICAL CARE MEDICINE    History of Presenting Illness  Luz Maria Burnett is a 74 y.o. female patient with lung mass, lung nodules, emphysema, and tobacco use ongoing, here to establish care.    History of Present Illness    Luz Maria Burnett presents today as a new patient, referred to our clinic by her primary care provider Dr. Panfilo Ward for lung mass and nodules.  She had her LDCT done 3/12/2025 which showed a 10 cm cavitary mass in the right lower lobe.  There were two additional 1 cm nodules in the left lung.  Patient denies using any antibiotics or steroids for her lungs recently, denies any fevers or chills.  She has not had any recent ER visits or hospitalizations for her breathing.  Her primary respiratory complaint is shortness of breath with exertion, able to continue doing her normal activities as long as she paces herself and takes frequent breaks.  She also notes a lot of fatigue.  She recently quit smoking 1 month ago, 50 pack years.  Patient denies any hemoptysis, swollen lymph nodes, or night sweats.  She has lost about 40 pounds unintentionally in the last year, attributes some of this to stress as her  has been in poor health.  She has a family history of COPD/emphysema.  Overall, patient is doing well and has no additional concerns at this time.  Patient is able to perform ADLs without difficulty.  I have personally reviewed patient's past family, social, medical and surgical histories and agree with their findings.    Pulmonary Meds  None    Pulmonary Equipment  None      Family History   Problem Relation Age of Onset    Heart disease Father     Diabetes Father     Early death Father     Hearing loss Father      Hyperlipidemia Father     Arthritis Mother     Asthma Mother     Cancer Brother     Diabetes Brother         Type II    Cancer Brother     Diabetes Brother         Type II    Hearing loss Brother     Diabetes Brother         Type II    Malig Hyperthermia Neg Hx         Social History     Socioeconomic History    Marital status:    Tobacco Use    Smoking status: Former     Current packs/day: 0.00     Average packs/day: 0.6 packs/day for 77.8 years (50.3 ttl pk-yrs)     Types: Cigarettes     Start date:      Quit date:      Years since quittin.2    Smokeless tobacco: Never    Tobacco comments:     none   Vaping Use    Vaping status: Never Used   Substance and Sexual Activity    Alcohol use: Not Currently     Comment: no current use    Drug use: Never    Sexual activity: Yes     Partners: Male     Birth control/protection: Post-menopausal, None        Past Medical History:   Diagnosis Date    Ankle pain, right     Colon polyp 2000    have had 4 or 5 colonoscopy    Depression     Foot pain, right     GERD (gastroesophageal reflux disease)     Irritable bowel syndrome     Psoriasis         Immunization History   Administered Date(s) Administered    COVID-19 (MODERNA) 1st,2nd,3rd Dose Monovalent 2021, 2021, 10/27/2021    COVID-19 (MODERNA) BIVALENT 12+YRS 10/27/2022    COVID-19 (MODERNA) Monovalent Original Booster 2021, 2021, 10/27/2021    Flu Vaccine Quad PF >36MO 10/14/2020    Fluad Quad 65+ 10/08/2020    Fluzone High-Dose 65+yrs 10/27/2022    Pneumococcal Conjugate 13-Valent (PCV13) 08/10/2016    Pneumococcal Polysaccharide (PPSV23) 2018       No Known Allergies       Current Outpatient Medications:     loperamide (Imodium A-D) 2 MG tablet, Imodium A-D 2 mg oral tablet take 2 tablets by oral route bid   Active, Disp: , Rfl:     albuterol sulfate  (90 Base) MCG/ACT inhaler, Inhale 2 puffs Every 4 (Four) Hours As Needed for Wheezing or Shortness of  Air., Disp: 18 g, Rfl: 11    FLUoxetine (PROzac) 20 MG capsule, Take 1 capsule by mouth Daily. (Patient not taking: Reported on 3/18/2025), Disp: 90 capsule, Rfl: 4    folic acid (FOLVITE) 1 MG tablet, Take 1 tablet by mouth Daily. (Patient not taking: Reported on 3/18/2025), Disp: 90 tablet, Rfl: 4    methotrexate 2.5 MG tablet, Take 4 tablets by mouth 1 (One) Time Per Week. (Patient not taking: Reported on 3/18/2025), Disp: , Rfl:     omeprazole OTC (PrilOSEC OTC) 20 MG EC tablet, Prilosec OTC 20 mg oral tablet,delayed release (DR/EC) take 1 tablet by oral route daily   Active (Patient not taking: Reported on 3/18/2025), Disp: , Rfl:     rosuvastatin (Crestor) 10 MG tablet, Take 1 tablet by mouth Daily. (Patient not taking: Reported on 3/18/2025), Disp: 90 tablet, Rfl: 3       Objective     Vitals:    03/18/25 1301   BP: 126/78   Pulse: 104   Resp: 18   Temp: 98 °F (36.7 °C)   SpO2: 97%       Physical Exam  Constitutional:       General: She is not in acute distress.     Appearance: Normal appearance. She is normal weight. She is not ill-appearing.   HENT:      Right Ear: Tympanic membrane and ear canal normal.      Left Ear: Tympanic membrane and ear canal normal.      Nose: Nose normal.      Mouth/Throat:      Mouth: Mucous membranes are moist.      Pharynx: Oropharynx is clear.   Cardiovascular:      Rate and Rhythm: Normal rate and regular rhythm.      Pulses: Normal pulses.      Heart sounds: Normal heart sounds.   Pulmonary:      Effort: Pulmonary effort is normal. No respiratory distress.      Breath sounds: Normal breath sounds. No stridor. No wheezing, rhonchi or rales.   Musculoskeletal:         General: No swelling. Normal range of motion.      Cervical back: Normal range of motion and neck supple.      Right lower leg: No edema.      Left lower leg: No edema.   Skin:     General: Skin is warm and dry.   Neurological:      General: No focal deficit present.      Mental Status: She is alert and oriented  to person, place, and time.      Motor: No weakness.   Psychiatric:         Mood and Affect: Mood normal.         Behavior: Behavior normal.         Result Review :   I have personally reviewed patient's labs and images.     Results    -Patient is enrolled in annual lung cancer screening program, managed by PCP.  LDCT 3/12/2025 showed a 10 cm mass in the right lower lobe with central necrosis or cavitation.  There was also mediastinal adenopathy noted.  This could represent bronchogenic malignancy or possible granulomatous disease.  There is also 1 cm cavitary lesion in the left upper lobe and 1 cm solid lesion in the left lower lobe.  Marked emphysema also noted.       Assessment and Plan        Diagnoses and all orders for this visit:    1. Lung mass (Primary)  -     Case Request; Standing  -     Follow Anesthesia Guidlines / Standing Orders; Standing  -     Follow Anesthesia Guidelines / Protocol; Future  -     Case Request    2. Multiple pulmonary nodules    3. Pulmonary emphysema, unspecified emphysema type    4. Dyspnea on exertion  -     albuterol sulfate  (90 Base) MCG/ACT inhaler; Inhale 2 puffs Every 4 (Four) Hours As Needed for Wheezing or Shortness of Air.  Dispense: 18 g; Refill: 11    5. Weight loss    6. Tobacco abuse    7. Encounter for smoking cessation counseling       Assessment & Plan    -Patient has PFTs scheduled for 5/21/2025  -PET scan scheduled for 3/26/2025, will keep this appointment for now in case biopsy positive for malignancy, as we will need it for staging  -Patient's imaging reviewed by Dr. Best, recommends EBUS bronchoscopy for tissue sampling.  Discussed risks and benefits with patient today, she would like to proceed.  We will get her scheduled.  -Begin using albuterol inhaler 2 puffs every 4 hours as needed, may add/adjust inhalers based on PFT results  -Follow-up after biopsy    Smoking history reviewed.  Encouraged patient to continue with recent cessation.  I spent 5  minutes today counseling patient on the risks of smoking, including throat cancer, lung cancer, COPD, heart disease and death.  Also discussed the benefits of quitting.  Vaccination status: Patient reports she is up-to-date with her flu, pneumonia, and Covid vaccines.  Patient is advised to continue to follow CDC recommendations such as social distancing wearing a mask and washing hands for at least 20 seconds.  Medications personally reviewed.    Follow Up   No follow-ups on file.  Patient was given instructions and counseling regarding her condition or for health maintenance advice. Please see specific information pulled into the AVS if appropriate.

## 2025-03-21 NOTE — PRE-PROCEDURE INSTRUCTIONS
PATIENT INSTRUCTED TO BE:    - NOTHING TO EAT AFTER MIDNIGHT OR CHEW, EXCEPT CAN HAVE CLEAR LIQUIDS 2 HOURS PRIOR TO SURGERY ARRIVAL TIME , NO MORE THAN 8 OZ. (NOTHING RED)     - TO HOLD ALL VITAMINS, SUPPLEMENTS, NSAIDS FOR ONE WEEK PRIOR TO THEIR SURGICAL PROCEDURE  INSTRUCTED NO LOTIONS, JEWELRY, PIERCINGS,  NAIL POLISH, OR DEODORANT DAY OF SURGERY    -INSTRUCTED TO TAKE THE FOLLOWING MEDICATIONS THE DAY OF SURGERY WITH SIPS OF WATER:  USE ALBUTEROL INHALER    - DO NOT BRING ANY MEDICATIONS WITH YOU TO THE HOSPITAL THE DAY OF SURGERY, EXCEPT IF USE INHALERS. BRING INHALERS DAY OF SURGERY         - DO NOT SMOKE OR VAPE 24 HOURS PRIOR TO PROCEDURE PER ANESTHESIA REQUEST     -MAKE SURE YOU HAVE A RIDE HOME OR SOMEONE TO STAY WITH YOU THE DAY OF THE PROCEDURE AFTER YOU GO HOME     - FOLLOW ANY OTHER INSTRUCTIONS GIVEN TO YOU BY YOUR SURGEON'S OFFICE.     DAY OF SURGERY _3/25/25__ _______ AT [facility] ( [address]), YOU CAN  PARK OR SELF PARK. ENTER THE PAVILION THRU MAIN ENTRANCE, TAKE ELEVATORS TO THE FIRST FLOOR, CHECK IN AT THE DESK FOR REGISTRATION/ SURGERY.   - YOU WILL RECEIVE A PHONE CALL THE DAY PRIOR TO SURGERY BETWEEN 1PM AND 4 PM WITH ARRIVAL TIME, IF YOUR SURGERY IS ON A MONDAY YOU WILL RECEIVE A CALL THE FRIDAY PRIOR TO SURGERY DATE    - BRING CASH OR CREDIT CARD FOR COPAYMENT OF MEDICATIONS AFTER SURGERY IF YOU USE THE HOSPITAL PHARMACY (MEDS TO BED)    - PREADMISSION TESTING NURSE DANNY Garces [phone]  IF HAVE ANY QUESTIONS     -PATIENT PROVIDED THE NUMBER FOR PREOP SURGICAL DEPT IF HAD QUESTIONS AFTER HOURS PRIOR TO SURGERY ([phone]).  INFORMED PT IF NO ANSWER, LEAVE A MESSAGE AND SOMEONE WILL RETURN THEIR CALL       PATIENT VERBALIZED UNDERSTANDING       Clear Liquid Diet        Find out when you need to start a clear liquid diet.   Think of “clear liquids” as anything you could read a newspaper through. This includes things like water, broth, sports  drinks, or tea WITHOUT any kind of milk or cream.           Once you are told to start a clear liquid diet, only drink these things until 2 hours before arrival to the hospital or when the hospital says to stop. Total volume limitation: 8 oz.       Clear liquids you CAN drink:   Water   Clear broth: beef, chicken, vegetable, or bone broth with nothing in it   Gatorade   Lemonade or Philippe-aid   Soda   Tea, coffee (NO cream or honey)   Jell-O (without fruit)   Popsicles (without fruit or cream)   Italian ices   Juice without pulp: apple, white, grape   You may use salt, pepper, and sugar  NO RED  NO NOODLES    Do NOT drink:   Milk or cream   Soy milk, almond milk, coconut milk, or other non-dairy drinks and   creamers   Milkshakes or smoothies   Tomato juice   Orange juice   Grapefruit juice   Cream soups or any other than broth         Clear Liquid Diet:  Do NOT eat any solid food.  Do NOT eat or suck on mints or candy.  Do NOT chew gum.  Do NOT drink thick liquids like milk or juice with pulp in it.  Do NOT add milk, cream, or anything like soy milk or almond milk to coffee or tea.

## 2025-03-25 ENCOUNTER — ANESTHESIA EVENT (OUTPATIENT)
Dept: PERIOP | Facility: HOSPITAL | Age: 75
End: 2025-03-25
Payer: MEDICARE

## 2025-03-25 ENCOUNTER — HOSPITAL ENCOUNTER (OUTPATIENT)
Facility: HOSPITAL | Age: 75
Setting detail: HOSPITAL OUTPATIENT SURGERY
Discharge: HOME | End: 2025-03-25
Attending: INTERNAL MEDICINE | Admitting: INTERNAL MEDICINE
Payer: MEDICARE

## 2025-03-25 ENCOUNTER — ANESTHESIA (OUTPATIENT)
Dept: PERIOP | Facility: HOSPITAL | Age: 75
End: 2025-03-25
Payer: MEDICARE

## 2025-03-25 VITALS
BODY MASS INDEX: 16.55 KG/M2 | TEMPERATURE: 97.6 F | SYSTOLIC BLOOD PRESSURE: 129 MMHG | HEIGHT: 66 IN | DIASTOLIC BLOOD PRESSURE: 67 MMHG | HEART RATE: 110 BPM | RESPIRATION RATE: 16 BRPM | OXYGEN SATURATION: 95 % | WEIGHT: 102.95 LBS

## 2025-03-25 DIAGNOSIS — R91.8 LUNG MASS: ICD-10-CM

## 2025-03-25 LAB
ACB CMPLX DNA BAL NAA+NON-PRB-NCNCRNG: NOT DETECTED
BLACTX-M ISLT/SPM QL: NORMAL
BLAIMP ISLT/SPM QL: NORMAL
BLAKPC ISLT/SPM QL: NORMAL
BLAOXA-48-LIKE ISLT/SPM QL: NORMAL
BLAVIM ISLT/SPM QL: NORMAL
C PNEUM DNA NPH QL NAA+NON-PROBE: NOT DETECTED
CILIATED BAL QL: 24 %
E CLOAC COMP DNA BAL NAA+NON-PRB-NCNCRNG: NOT DETECTED
E COLI DNA BAL NAA+NON-PRB-NCNCRNG: NOT DETECTED
EOSINOPHIL NFR FLD MANUAL: 6 %
FLUAV SUBTYP SPEC NAA+PROBE: NOT DETECTED
FLUBV RNA ISLT QL NAA+PROBE: NOT DETECTED
GP B STREP DNA BAL NAA+NON-PRB-NCNCRNG: NOT DETECTED
HADV DNA SPEC NAA+PROBE: NOT DETECTED
HAEM INFLU DNA BAL NAA+NON-PRB-NCNCRNG: NOT DETECTED
HCOV RNA LOWER RESP QL NAA+NON-PROBE: NOT DETECTED
HMPV RNA NPH QL NAA+NON-PROBE: NOT DETECTED
HPIV RNA LOWER RESP QL NAA+NON-PROBE: NOT DETECTED
K AEROGENES DNA BAL NAA+NON-PRB-NCNCRNG: NOT DETECTED
K OXYTOCA DNA BAL NAA+NON-PRB-NCNCRNG: NOT DETECTED
K PNEU GRP DNA BAL NAA+NON-PRB-NCNCRNG: NOT DETECTED
L PNEUMO DNA LOWER RESP QL NAA+NON-PROBE: NOT DETECTED
LYMPHOCYTES NFR FLD MANUAL: 12 %
M CATARRHALIS DNA BAL NAA+NON-PRB-NCNCRNG: NOT DETECTED
M PNEUMO IGG SER IA-ACNC: NOT DETECTED
MACROPHAGE FLUID %: 16 %
MECA+MECC ISLT/SPM QL: NORMAL
NDM GENE: NORMAL
NEUTROPHILS NFR FLD MANUAL: 42 %
P AERUGINOSA DNA BAL NAA+NON-PRB-NCNCRNG: NOT DETECTED
PROTEUS SP DNA BAL NAA+NON-PRB-NCNCRNG: NOT DETECTED
RHINOVIRUS RNA SPEC NAA+PROBE: NOT DETECTED
RSV RNA NPH QL NAA+NON-PROBE: NOT DETECTED
S AUREUS DNA BAL NAA+NON-PRB-NCNCRNG: NOT DETECTED
S MARCESCENS DNA BAL NAA+NON-PRB-NCNCRNG: NOT DETECTED
S PNEUM DNA BAL NAA+NON-PRB-NCNCRNG: NOT DETECTED
S PYO DNA BAL NAA+NON-PRB-NCNCRNG: NOT DETECTED
VISUAL PRESENCE OF BLOOD: NORMAL

## 2025-03-25 PROCEDURE — 87205 SMEAR GRAM STAIN: CPT | Performed by: INTERNAL MEDICINE

## 2025-03-25 PROCEDURE — 87116 MYCOBACTERIA CULTURE: CPT | Performed by: INTERNAL MEDICINE

## 2025-03-25 PROCEDURE — 88342 IMHCHEM/IMCYTCHM 1ST ANTB: CPT | Performed by: INTERNAL MEDICINE

## 2025-03-25 PROCEDURE — 87102 FUNGUS ISOLATION CULTURE: CPT | Performed by: INTERNAL MEDICINE

## 2025-03-25 PROCEDURE — 88341 IMHCHEM/IMCYTCHM EA ADD ANTB: CPT | Performed by: INTERNAL MEDICINE

## 2025-03-25 PROCEDURE — 25010000002 MIDAZOLAM PER 1MG: Performed by: ANESTHESIOLOGY

## 2025-03-25 PROCEDURE — 25810000003 LACTATED RINGERS PER 1000 ML: Performed by: ANESTHESIOLOGY

## 2025-03-25 PROCEDURE — 25010000002 LIDOCAINE HCL (PF) 4 % SOLUTION: Performed by: INTERNAL MEDICINE

## 2025-03-25 PROCEDURE — 89051 BODY FLUID CELL COUNT: CPT | Performed by: INTERNAL MEDICINE

## 2025-03-25 PROCEDURE — 31628 BRONCHOSCOPY/LUNG BX EACH: CPT | Performed by: INTERNAL MEDICINE

## 2025-03-25 PROCEDURE — 87633 RESP VIRUS 12-25 TARGETS: CPT | Performed by: INTERNAL MEDICINE

## 2025-03-25 PROCEDURE — 88173 CYTOPATH EVAL FNA REPORT: CPT | Performed by: INTERNAL MEDICINE

## 2025-03-25 PROCEDURE — 88305 TISSUE EXAM BY PATHOLOGIST: CPT | Performed by: INTERNAL MEDICINE

## 2025-03-25 PROCEDURE — 25010000002 PROPOFOL 10 MG/ML EMULSION: Performed by: NURSE ANESTHETIST, CERTIFIED REGISTERED

## 2025-03-25 PROCEDURE — 88108 CYTOPATH CONCENTRATE TECH: CPT | Performed by: INTERNAL MEDICINE

## 2025-03-25 PROCEDURE — 31624 DX BRONCHOSCOPE/LAVAGE: CPT | Performed by: INTERNAL MEDICINE

## 2025-03-25 PROCEDURE — 31653 BRONCH EBUS SAMPLNG 3/> NODE: CPT | Performed by: INTERNAL MEDICINE

## 2025-03-25 PROCEDURE — C1726 CATH, BAL DIL, NON-VASCULAR: HCPCS | Performed by: INTERNAL MEDICINE

## 2025-03-25 PROCEDURE — 25010000002 LIDOCAINE PF 2% 2 % SOLUTION: Performed by: NURSE ANESTHETIST, CERTIFIED REGISTERED

## 2025-03-25 PROCEDURE — 87206 SMEAR FLUORESCENT/ACID STAI: CPT | Performed by: INTERNAL MEDICINE

## 2025-03-25 PROCEDURE — 31645 BRNCHSC W/THER ASPIR 1ST: CPT | Performed by: INTERNAL MEDICINE

## 2025-03-25 PROCEDURE — 87070 CULTURE OTHR SPECIMN AEROBIC: CPT | Performed by: INTERNAL MEDICINE

## 2025-03-25 PROCEDURE — 87071 CULTURE AEROBIC QUANT OTHER: CPT | Performed by: INTERNAL MEDICINE

## 2025-03-25 RX ORDER — PROPOFOL 10 MG/ML
VIAL (ML) INTRAVENOUS AS NEEDED
Status: DISCONTINUED | OUTPATIENT
Start: 2025-03-25 | End: 2025-03-25 | Stop reason: SURG

## 2025-03-25 RX ORDER — MIDAZOLAM HYDROCHLORIDE 2 MG/2ML
1 INJECTION, SOLUTION INTRAMUSCULAR; INTRAVENOUS ONCE
Status: COMPLETED | OUTPATIENT
Start: 2025-03-25 | End: 2025-03-25

## 2025-03-25 RX ORDER — METOPROLOL TARTRATE 1 MG/ML
INJECTION, SOLUTION INTRAVENOUS AS NEEDED
Status: DISCONTINUED | OUTPATIENT
Start: 2025-03-25 | End: 2025-03-25 | Stop reason: SURG

## 2025-03-25 RX ORDER — PROMETHAZINE HYDROCHLORIDE 25 MG/1
25 TABLET ORAL ONCE AS NEEDED
Status: DISCONTINUED | OUTPATIENT
Start: 2025-03-25 | End: 2025-03-25 | Stop reason: HOSPADM

## 2025-03-25 RX ORDER — LIDOCAINE HYDROCHLORIDE 20 MG/ML
INJECTION, SOLUTION EPIDURAL; INFILTRATION; INTRACAUDAL; PERINEURAL AS NEEDED
Status: DISCONTINUED | OUTPATIENT
Start: 2025-03-25 | End: 2025-03-25 | Stop reason: SURG

## 2025-03-25 RX ORDER — LIDOCAINE HYDROCHLORIDE 40 MG/ML
INJECTION, SOLUTION RETROBULBAR AS NEEDED
Status: DISCONTINUED | OUTPATIENT
Start: 2025-03-25 | End: 2025-03-25 | Stop reason: HOSPADM

## 2025-03-25 RX ORDER — OXYCODONE HYDROCHLORIDE 5 MG/1
5 TABLET ORAL
Status: DISCONTINUED | OUTPATIENT
Start: 2025-03-25 | End: 2025-03-25 | Stop reason: HOSPADM

## 2025-03-25 RX ORDER — DEXMEDETOMIDINE HYDROCHLORIDE 100 UG/ML
INJECTION, SOLUTION INTRAVENOUS AS NEEDED
Status: DISCONTINUED | OUTPATIENT
Start: 2025-03-25 | End: 2025-03-25 | Stop reason: SURG

## 2025-03-25 RX ORDER — ONDANSETRON 2 MG/ML
4 INJECTION INTRAMUSCULAR; INTRAVENOUS ONCE AS NEEDED
Status: DISCONTINUED | OUTPATIENT
Start: 2025-03-25 | End: 2025-03-25 | Stop reason: HOSPADM

## 2025-03-25 RX ORDER — PROMETHAZINE HYDROCHLORIDE 25 MG/1
25 SUPPOSITORY RECTAL ONCE AS NEEDED
Status: DISCONTINUED | OUTPATIENT
Start: 2025-03-25 | End: 2025-03-25 | Stop reason: HOSPADM

## 2025-03-25 RX ORDER — SODIUM CHLORIDE, SODIUM LACTATE, POTASSIUM CHLORIDE, CALCIUM CHLORIDE 600; 310; 30; 20 MG/100ML; MG/100ML; MG/100ML; MG/100ML
9 INJECTION, SOLUTION INTRAVENOUS CONTINUOUS PRN
Status: DISCONTINUED | OUTPATIENT
Start: 2025-03-25 | End: 2025-03-25 | Stop reason: HOSPADM

## 2025-03-25 RX ORDER — PHENYLEPHRINE HCL IN 0.9% NACL 1 MG/10 ML
SYRINGE (ML) INTRAVENOUS AS NEEDED
Status: DISCONTINUED | OUTPATIENT
Start: 2025-03-25 | End: 2025-03-25 | Stop reason: SURG

## 2025-03-25 RX ADMIN — LIDOCAINE HYDROCHLORIDE 100 MG: 20 INJECTION, SOLUTION EPIDURAL; INFILTRATION; INTRACAUDAL; PERINEURAL at 16:33

## 2025-03-25 RX ADMIN — Medication 200 MCG: at 16:54

## 2025-03-25 RX ADMIN — Medication 200 MCG: at 17:08

## 2025-03-25 RX ADMIN — Medication 100 MCG: at 17:00

## 2025-03-25 RX ADMIN — PROPOFOL 150 MCG/KG/MIN: 10 INJECTION, EMULSION INTRAVENOUS at 16:33

## 2025-03-25 RX ADMIN — MIDAZOLAM HYDROCHLORIDE 1 MG: 1 INJECTION, SOLUTION INTRAMUSCULAR; INTRAVENOUS at 16:20

## 2025-03-25 RX ADMIN — PROPOFOL 50 MG: 10 INJECTION, EMULSION INTRAVENOUS at 16:33

## 2025-03-25 RX ADMIN — DEXMEDETOMIDINE 5 MCG: 100 INJECTION, SOLUTION, CONCENTRATE INTRAVENOUS at 16:33

## 2025-03-25 RX ADMIN — Medication 100 MCG: at 17:03

## 2025-03-25 RX ADMIN — Medication 200 MCG: at 16:50

## 2025-03-25 RX ADMIN — DEXMEDETOMIDINE 5 MCG: 100 INJECTION, SOLUTION, CONCENTRATE INTRAVENOUS at 16:36

## 2025-03-25 RX ADMIN — DEXMEDETOMIDINE 5 MCG: 100 INJECTION, SOLUTION, CONCENTRATE INTRAVENOUS at 16:40

## 2025-03-25 RX ADMIN — Medication 200 MCG: at 17:14

## 2025-03-25 RX ADMIN — SODIUM CHLORIDE, POTASSIUM CHLORIDE, SODIUM LACTATE AND CALCIUM CHLORIDE: 600; 310; 30; 20 INJECTION, SOLUTION INTRAVENOUS at 16:28

## 2025-03-25 RX ADMIN — Medication 100 MCG: at 17:12

## 2025-03-25 RX ADMIN — METOPROLOL TARTRATE 2 MG: 1 INJECTION, SOLUTION INTRAVENOUS at 16:40

## 2025-03-25 RX ADMIN — Medication 200 MCG: at 16:52

## 2025-03-25 NOTE — ANESTHESIA POSTPROCEDURE EVALUATION
Patient: Luz Maria Burnett    Procedure Summary       Date: 03/25/25 Room / Location: [facility] OR 01 / [facility] MAIN OR    Anesthesia Start: 1628 Anesthesia Stop: 1722    Procedure: BRONCHOSCOPY WITH ENDOBRONCHIAL ULTRASOUND: NEEDLE ASPIRATE, BAL, WASHINGS, BIOPSIES insertion of lighted instrument to view inside the lung and ultrasound examination with possible ultrasound-guided tissue removal for testing (Bronchus) Diagnosis:       Lung mass      (Lung mass [R91.8])    Surgeons: Suman Best MD Provider: David Harrison MD    Anesthesia Type: general ASA Status: 3            Anesthesia Type: general    Vitals  Vitals Value Taken Time   BP 92/54 03/25/25 17:45   Temp     Pulse 84 03/25/25 17:47   Resp 18 03/25/25 17:15   SpO2 88 % 03/25/25 17:47   Vitals shown include unfiled device data.        Post Anesthesia Care and Evaluation    Patient location during evaluation: bedside  Patient participation: complete - patient participated  Level of consciousness: awake and alert  Pain management: adequate    Airway patency: patent  Anesthetic complications: No anesthetic complications  PONV Status: none  Cardiovascular status: acceptable  Respiratory status: acceptable  Hydration status: acceptable    Comments: An Anesthesiologist personally participated in the most demanding procedures (including induction and emergence if applicable) in the anesthesia plan, monitored the course of anesthesia administration at frequent intervals and remained physically present and available for immediate diagnosis and treatment of emergencies.

## 2025-03-25 NOTE — OP NOTE
Bronchoscopy Procedure Note  Procedure:    1. Endobronchial ultrasound guided lymph node aspiration.  2. Transbronchial biopsy right lower lobe  3.  Bronchoalveolar lavage right lower lobe  4. Bronchial washings tracheobronchial tree  5. Airway inspection    Pre-Operative Diagnosis: Mediastinal lymphadenopathy, right lower lobe lung mass    Post-Operative Diagnosis: Same    Indication: Mediastinal lymphadenopathy, right lower lobe lung mass    Anesthesia: MAC anesthesia.    Procedure Details: Patient was consented for the procedure with all risks and benefits of the procedure explained in detail. Patient was given the opportunity to ask questions and all concerns were answered. The Endobronchial ultrasound bronchoscope (EBUS) was inserted into the main airway via the mouth. An anatomical and mediastinal survey was done of the main airways and the subsegmental bronchus with EBUS scope. Enlarged subcarinal, right paratracheal and right hilar lymph nodes were seen. Transbronchial aspiration biopsy of lymph nodes were done starting at subcarinal (7 station) with 4 passes, then right paratracheal (4R station) with 5 passes, then right hilar (10R station) with 6 passes. Then scope was changed to regular bronchoscope, airway inspection was done.  Irregular mucosa right lower lobe was seen.  Extrinsic compression and partial obstruction of right lower lobe airway was seen.  A bronchoalveolar lavage was performed using 60 mL aliquot of normal saline x 2, with 45 mL serosanguineous fluid in return from right lower lobe lung. A transbronchial biopsy of right lower lobe lung was done with several passes blindly.  Post procedure no active bleeding was seen.  Bronchial washing was obtained from entire tracheobronchial tree.    Estimated Blood Loss: Insignificant      Specimens:  - Cytology specimen from subcarinal, right paratracheal and right hilar lymph node stations  - Transbronchial biopsy right lower lobe  -45 cc  bronchoalveolar lavage right lower lobe  - Bronchial washing      Complications: No complications during or after the procedure.    Disposition: To home. Follow up in clinic in a week.    Patient tolerated the procedure well.      Electronically signed by Suman Best MD, 3/25/2025, 17:16 EDT.

## 2025-03-25 NOTE — ANESTHESIA PREPROCEDURE EVALUATION
Anesthesia Evaluation     Patient summary reviewed and Nursing notes reviewed   no history of anesthetic complications:   NPO Solid Status: > 8 hours  NPO Liquid Status: > 2 hours           Airway   Mallampati: II  TM distance: >3 FB  Neck ROM: full  No difficulty expected  Dental      Pulmonary - negative pulmonary ROS and normal exam    breath sounds clear to auscultation  (+) COPD,  Cardiovascular - negative cardio ROS and normal exam  Exercise tolerance: poor (<4 METS)    Rhythm: regular  Rate: normal        Neuro/Psych- negative ROS  (+) psychiatric history Depression and Anxiety  GI/Hepatic/Renal/Endo - negative ROS   (+) GERD well controlled    Musculoskeletal (-) negative ROS    Abdominal    Substance History - negative use     OB/GYN negative ob/gyn ROS         Other - negative ROS       ROS/Med Hx Other: PAT Nursing Notes unavailable.               Anesthesia Plan    ASA 3     general     (Patient understands anesthesia not responsible for dental damage.)  intravenous induction     Anesthetic plan, risks, benefits, and alternatives have been provided, discussed and informed consent has been obtained with: patient.  Pre-procedure education provided  Plan discussed with CRNA.    CODE STATUS:

## 2025-03-26 ENCOUNTER — HOSPITAL ENCOUNTER (OUTPATIENT)
Dept: PET IMAGING | Facility: HOSPITAL | Age: 75
Discharge: HOME | End: 2025-03-26
Payer: MEDICARE

## 2025-03-26 ENCOUNTER — HOSPITAL ENCOUNTER (OUTPATIENT)
Dept: PET IMAGING | Facility: HOSPITAL | Age: 75
Discharge: HOME | End: 2025-03-26
Admitting: STUDENT IN AN ORGANIZED HEALTH CARE EDUCATION/TRAINING PROGRAM
Payer: MEDICARE

## 2025-03-26 DIAGNOSIS — R91.8 LUNG MASS: ICD-10-CM

## 2025-03-26 PROCEDURE — A9552 F18 FDG: HCPCS | Performed by: STUDENT IN AN ORGANIZED HEALTH CARE EDUCATION/TRAINING PROGRAM

## 2025-03-26 PROCEDURE — 34310000005 FLUDEOXYGLUCOSE F18 SOLUTION: Performed by: STUDENT IN AN ORGANIZED HEALTH CARE EDUCATION/TRAINING PROGRAM

## 2025-03-26 PROCEDURE — 78815 PET IMAGE W/CT SKULL-THIGH: CPT

## 2025-03-26 RX ADMIN — FLUDEOXYGLUCOSE F 18 1 DOSE: 200 INJECTION, SOLUTION INTRAVENOUS at 13:25

## 2025-03-27 LAB
BACTERIA SPEC AEROBE CULT: NORMAL
BACTERIA SPEC RESP CULT: NORMAL
CYTO UR: NORMAL
GRAM STN SPEC: NORMAL
LAB AP CASE REPORT: NORMAL
LAB AP CLINICAL INFORMATION: NORMAL
LAB AP DIAGNOSIS COMMENT: NORMAL
LAB AP SPECIAL STAINS: NORMAL
PATH REPORT.FINAL DX SPEC: NORMAL
PATH REPORT.GROSS SPEC: NORMAL

## 2025-03-30 LAB
FUNGUS WND CULT: NORMAL
MYCOBACTERIUM SPEC CULT: NORMAL
NIGHT BLUE STAIN TISS: NORMAL
NIGHT BLUE STAIN TISS: NORMAL

## 2025-03-31 LAB
CYTO UR: NORMAL
LAB AP CASE REPORT: NORMAL
LAB AP CLINICAL INFORMATION: NORMAL
LAB AP SPECIAL STAINS: NORMAL
PATH REPORT.FINAL DX SPEC: NORMAL
PATH REPORT.GROSS SPEC: NORMAL

## 2025-03-31 NOTE — PROGRESS NOTES
Primary Care Provider  Panfilo Ward MD   Referring Provider  No ref. provider found      Patient Complaint  Follow-up (Bronch Follow up) and Lung mass    Subjective          Luz Maria Burnett presents to [facility]    History of Presenting Illness  Luz Maria Burnett is a 74 y.o. female patient with lung mass, lung nodules, emphysema, and tobacco use ongoing, here for 2 week follow up.    History of Present Illness    Luz Maria Burnett states she is doing well since her last visit, here today to go over lung biopsy results.  She had EBUS bronchoscopy with Dr. Best 3/25/2025 after her LDCT done 3/12/2025 showed a 10 cm cavitary mass in the right lower lobe.  There were two additional 1 cm nodules in the left lung.  We discussed that her biopsy was positive for primary adenocarcinoma of the lung.  She also had a PET scan which showed hypermetabolic activity of the lung mass, thoracic lymph nodes, other lung nodules, and an area in her colon.  Today, patient denies using any antibiotics or steroids for her lungs recently, denies any fevers or chills.  She has not had any recent ER visits or hospitalizations for her breathing.  Her primary respiratory complaint is shortness of breath with exertion, able to continue doing her normal activities as long as she paces herself and takes frequent breaks.  She also notes a lot of fatigue.  She recently quit smoking 2 months ago, 50 pack years.  Patient denies any hemoptysis, swollen lymph nodes, or night sweats.  She has lost about 40 pounds unintentionally in the last year, attributes some of this to stress as her  has been in poor health.  She has a family history of COPD/emphysema.  Overall, patient is doing well and has no additional concerns at this time.  Patient is able to perform ADLs without difficulty.  I have personally reviewed patient's past family, social, medical and surgical histories and agree with their  findings.    Pulmonary Meds  Albuterol inhaler    Pulmonary Equipment  None      Family History   Problem Relation Age of Onset    Heart disease Father     Diabetes Father     Early death Father     Hearing loss Father     Hyperlipidemia Father     Arthritis Mother     Asthma Mother     Cancer Brother     Diabetes Brother         Type II    Cancer Brother     Diabetes Brother         Type II    Hearing loss Brother     Diabetes Brother         Type II    Malig Hyperthermia Neg Hx         Social History     Socioeconomic History    Marital status:    Tobacco Use    Smoking status: Former     Current packs/day: 0.00     Average packs/day: 0.6 packs/day for 77.8 years (50.3 ttl pk-yrs)     Types: Cigarettes     Start date:      Quit date:      Years since quittin.2    Smokeless tobacco: Never    Tobacco comments:     Quit 4-5 weeks ago    Vaping Use    Vaping status: Never Used   Substance and Sexual Activity    Alcohol use: Not Currently     Comment: no current use    Drug use: Never    Sexual activity: Defer     Partners: Male     Birth control/protection: Post-menopausal, None        Past Medical History:   Diagnosis Date    Ankle pain, right     Cancer     skin cancers removed    Colon polyp 2000    have had 4 or 5 colonoscopy    COPD (chronic obstructive pulmonary disease)     Depression     Foot pain, right     GERD (gastroesophageal reflux disease)     Irritable bowel syndrome     Psoriasis         Immunization History   Administered Date(s) Administered    COVID-19 (MODERNA) 1st,2nd,3rd Dose Monovalent 2021, 2021, 10/27/2021    COVID-19 (MODERNA) BIVALENT 12+YRS 10/27/2022    COVID-19 (MODERNA) Monovalent Original Booster 2021, 2021, 10/27/2021    Flu Vaccine Quad PF >36MO 10/14/2020    Fluad Quad 65+ 10/08/2020    Fluzone High-Dose 65+yrs 10/27/2022    Pneumococcal Conjugate 13-Valent (PCV13) 08/10/2016    Pneumococcal Polysaccharide (PPSV23) 2018        No Known Allergies       Current Outpatient Medications:     albuterol sulfate  (90 Base) MCG/ACT inhaler, Inhale 2 puffs Every 4 (Four) Hours As Needed for Wheezing or Shortness of Air., Disp: 18 g, Rfl: 11    FLUoxetine (PROzac) 20 MG capsule, Take 1 capsule by mouth Daily. (Patient not taking: Reported on 4/1/2025), Disp: 90 capsule, Rfl: 4    folic acid (FOLVITE) 1 MG tablet, Take 1 tablet by mouth Daily. (Patient not taking: Reported on 4/1/2025), Disp: 90 tablet, Rfl: 4    loperamide (Imodium A-D) 2 MG tablet, Imodium A-D 2 mg oral tablet take 2 tablets by oral route bid   Active (Patient not taking: Reported on 4/1/2025), Disp: , Rfl:     methotrexate 2.5 MG tablet, Take 4 tablets by mouth 1 (One) Time Per Week. (Patient not taking: Reported on 4/1/2025), Disp: , Rfl:     omeprazole OTC (PrilOSEC OTC) 20 MG EC tablet, , Disp: , Rfl:     rosuvastatin (Crestor) 10 MG tablet, Take 1 tablet by mouth Daily. (Patient not taking: Reported on 4/1/2025), Disp: 90 tablet, Rfl: 3       Objective     Vitals:    04/01/25 1352   BP: 102/59   Pulse: 109   Resp: 20   Temp: 97.2 °F (36.2 °C)   SpO2: 95%         Physical Exam  Constitutional:       General: She is not in acute distress.     Appearance: Normal appearance. She is normal weight. She is not ill-appearing.   HENT:      Right Ear: Tympanic membrane and ear canal normal.      Left Ear: Tympanic membrane and ear canal normal.      Nose: Nose normal.      Mouth/Throat:      Mouth: Mucous membranes are moist.      Pharynx: Oropharynx is clear.   Cardiovascular:      Rate and Rhythm: Normal rate and regular rhythm.      Pulses: Normal pulses.      Heart sounds: Normal heart sounds.   Pulmonary:      Effort: Pulmonary effort is normal. No respiratory distress.      Breath sounds: Normal breath sounds. No stridor. No wheezing, rhonchi or rales.   Musculoskeletal:         General: No swelling. Normal range of motion.      Cervical back: Normal range of motion  and neck supple.      Right lower leg: No edema.      Left lower leg: No edema.   Skin:     General: Skin is warm and dry.   Neurological:      General: No focal deficit present.      Mental Status: She is alert and oriented to person, place, and time.      Motor: No weakness.   Psychiatric:         Mood and Affect: Mood normal.         Behavior: Behavior normal.         Result Review :   I have personally reviewed patient's labs and images.  I also reviewed my last progress note 3/18/2025.    Results    -Patient is enrolled in annual lung cancer screening program, managed by PCP.  LDCT 3/12/2025 showed a 10 cm mass in the right lower lobe with central necrosis or cavitation.  There was also mediastinal adenopathy noted.  This could represent bronchogenic malignancy or possible granulomatous disease.  There is also 1 cm cavitary lesion in the left upper lobe and 1 cm solid lesion in the left lower lobe.  Marked emphysema also noted.  -PET scan 3/26/2025 showed hypermetabolic activity of the right lower lobe mass with right pleural metastatic disease and hypermetabolic mediastinal and right hilar adenopathy.  There were also 2 hypermetabolic nodules in the left lung measuring 8 mm and 1 cm, could represent metastatic lesions or other primary malignancy.  There is a minimal right pleural effusion.  There was a 9 mm hypermetabolic focus in the ascending colon, recommend endoscopy to evaluate for polyp/mass.  -Ion bronchoscopy 3/25/2025 no growth of cultures to date.  Pathology of right lower lobe mass positive for adenocarcinoma, lung primary.  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       Diagnoses and all orders for this visit:    1. Adenocarcinoma of right lung (Primary)  -     Ambulatory Referral to Hematology / Oncology  -     Ambulatory Referral to Radiation Oncology  -     MRI Brain With & Without Contrast; Future    2. Lung mass    3. Multiple pulmonary nodules    4. Pulmonary  emphysema, unspecified emphysema type    5. Dyspnea on exertion    6. Weight loss    7. Tobacco abuse    8. Encounter for smoking cessation counseling    9. Abnormal gastrointestinal PET scan      Assessment & Plan  1. Adenocarcinoma of the right lung.  The biopsy results confirmed adenocarcinoma in the right lower lobe lung mass. The cancer has metastasized to the lymph nodes around the right lung, including lymph node stations 7, 4R, and 10R. A referral to oncology will be initiated today for further staging and treatment recommendations. An MRI of the brain will be ordered to rule out any metastatic lesions and complete staging.    2. Colon polyp.  A PET scan revealed a glowing spot in the ascending colon, which could indicate a cancerous lesion or inflammation. The patient has a history of multiple polyps, with 5 or 6 polyps removed in the past. She is wanting to postpone her appointment with Dr. Martinez tomorrow as she wants to see oncology and focus on her lung cancer first.  We discussed the risks of delaying evaluation of possibly malignant area in colon, patient understands. She has been advised to reschedule her appointment with Dr. Martinez.    3. Thick mucus production.  She reports experiencing thick mucus production and coughing from 2:00 AM to 6:00 AM. She is advised to take Mucinex to thin the mucus and make it easier to cough up. Samples of Mucinex will be provided today. Additionally, she can try taking Zyrtec or Allegra once a day for the next couple of weeks to help dry up any secretions.    PROCEDURE  Biopsy of the right lower lobe lung mass was performed on 03/25/2025.    -Patient has PFTs scheduled for 5/21/2025  -Urgent referrals to oncology and radiation oncology placed for management of adenocarcinoma of the lung  -Stat brain MRI ordered to complete staging  -Continue using albuterol inhaler 2 puffs every 4 hours as needed, may add/adjust inhalers based on PFT results  -Follow-up in 3  months, may return sooner if needed    Smoking history reviewed.  Encouraged patient to continue with recent cessation.  I spent 5 minutes today counseling patient on the risks of smoking, including throat cancer, lung cancer, COPD, heart disease and death.  Also discussed the benefits of quitting.  Vaccination status: Patient reports she is up-to-date with her flu, pneumonia, and Covid vaccines.  Patient is advised to continue to follow CDC recommendations such as social distancing wearing a mask and washing hands for at least 20 seconds.  Medications personally reviewed.    Follow Up   No follow-ups on file.  Patient was given instructions and counseling regarding her condition or for health maintenance advice. Please see specific information pulled into the AVS if appropriate.

## 2025-04-01 ENCOUNTER — TELEPHONE (OUTPATIENT)
Dept: GASTROENTEROLOGY | Facility: CLINIC | Age: 75
End: 2025-04-01
Payer: MEDICARE

## 2025-04-01 ENCOUNTER — OFFICE VISIT (OUTPATIENT)
Dept: PULMONOLOGY | Facility: CLINIC | Age: 75
End: 2025-04-01
Payer: MEDICARE

## 2025-04-01 VITALS
BODY MASS INDEX: 16.39 KG/M2 | SYSTOLIC BLOOD PRESSURE: 102 MMHG | HEART RATE: 109 BPM | WEIGHT: 102 LBS | RESPIRATION RATE: 20 BRPM | DIASTOLIC BLOOD PRESSURE: 59 MMHG | TEMPERATURE: 97.2 F | HEIGHT: 66 IN | OXYGEN SATURATION: 95 %

## 2025-04-01 DIAGNOSIS — R91.8 MULTIPLE PULMONARY NODULES: ICD-10-CM

## 2025-04-01 DIAGNOSIS — Z71.6 ENCOUNTER FOR SMOKING CESSATION COUNSELING: ICD-10-CM

## 2025-04-01 DIAGNOSIS — J43.9 PULMONARY EMPHYSEMA, UNSPECIFIED EMPHYSEMA TYPE: ICD-10-CM

## 2025-04-01 DIAGNOSIS — C34.91 ADENOCARCINOMA OF RIGHT LUNG: Primary | ICD-10-CM

## 2025-04-01 DIAGNOSIS — R91.8 LUNG MASS: ICD-10-CM

## 2025-04-01 DIAGNOSIS — Z72.0 TOBACCO ABUSE: ICD-10-CM

## 2025-04-01 DIAGNOSIS — R94.8 ABNORMAL GASTROINTESTINAL PET SCAN: ICD-10-CM

## 2025-04-01 DIAGNOSIS — R06.09 DYSPNEA ON EXERTION: ICD-10-CM

## 2025-04-01 DIAGNOSIS — R63.4 WEIGHT LOSS: ICD-10-CM

## 2025-04-01 PROCEDURE — 1160F RVW MEDS BY RX/DR IN RCRD: CPT

## 2025-04-01 PROCEDURE — 1159F MED LIST DOCD IN RCRD: CPT

## 2025-04-01 PROCEDURE — 99214 OFFICE O/P EST MOD 30 MIN: CPT

## 2025-04-01 NOTE — TELEPHONE ENCOUNTER
Patient is currently in our office and is wanting to cancel her appt with Dr. Martinez tomorrow.     WEN Estevez was added by you.    WEN Estevez  Apt was scheduled to discuss colonoscopy due to recent PET scan showing abnormality in the colon. patient needs to understand risk associated with canceling appointment. it is recommended to maintain appointment    Maye Mcnair  Yes, Doreen Bingham spoke with her today in regards to risks of canceling her appointment with you and told her to make sure she gets it rescheduled. We are sending patient for a stat MRI Brain, to Oncology, and Radiation Oncology.    8 mins  Would she like to reschedule because it would be July before we can get her back on the schedule?    I would highly recommend going ahead and rescheudling because we stay booked out.    I can always place her on the wait list once she is rescheduled    3 mins  Maye Mcnair  yes she would like to reschedule    KE  she is upfront checking out now and they said they will print off the appt for her    2 mins  sounds good, she can always call if the date does not work for her    2 mins  KE  Maye Mcnair  okay ty

## 2025-04-01 NOTE — TELEPHONE ENCOUNTER
"Patient was scheduled for urgent appt with Rosalba CARVER for \"lesion of the colon\" on 04.02.25.  Patient has requested to defer this appt due to recently being scheduled for multiple appointments as a result of recent PET scan.  She is aware of the risks of deferring this appointment.  I have rescheduled her for July 11th and placed her on the wait list.  Patient will call if she changes her mind and would like to be seen sooner.   "

## 2025-04-02 ENCOUNTER — HOSPITAL ENCOUNTER (OUTPATIENT)
Dept: MRI IMAGING | Facility: HOSPITAL | Age: 75
Discharge: HOME | End: 2025-04-02
Payer: MEDICARE

## 2025-04-02 DIAGNOSIS — C34.91 ADENOCARCINOMA OF RIGHT LUNG: ICD-10-CM

## 2025-04-02 LAB
CREAT BLDA-MCNC: 0.8 MG/DL (ref 0.6–1.3)
EGFRCR SERPLBLD CKD-EPI 2021: 77.4 ML/MIN/1.73

## 2025-04-02 PROCEDURE — 82565 ASSAY OF CREATININE: CPT

## 2025-04-02 PROCEDURE — A9577 INJ MULTIHANCE: HCPCS

## 2025-04-02 PROCEDURE — 25510000002 GADOBENATE DIMEGLUMINE 529 MG/ML SOLUTION

## 2025-04-02 PROCEDURE — 70553 MRI BRAIN STEM W/O & W/DYE: CPT

## 2025-04-02 RX ADMIN — GADOBENATE DIMEGLUMINE 9 ML: 529 INJECTION, SOLUTION INTRAVENOUS at 12:40

## 2025-04-04 ENCOUNTER — CONSULT (OUTPATIENT)
Dept: ONCOLOGY | Facility: HOSPITAL | Age: 75
End: 2025-04-04
Payer: MEDICARE

## 2025-04-04 VITALS
BODY MASS INDEX: 16.69 KG/M2 | DIASTOLIC BLOOD PRESSURE: 47 MMHG | HEIGHT: 66 IN | WEIGHT: 103.84 LBS | HEART RATE: 109 BPM | SYSTOLIC BLOOD PRESSURE: 101 MMHG | RESPIRATION RATE: 18 BRPM | OXYGEN SATURATION: 96 % | TEMPERATURE: 98.4 F

## 2025-04-04 DIAGNOSIS — C34.31 MALIGNANT NEOPLASM OF LOWER LOBE OF RIGHT LUNG: Primary | ICD-10-CM

## 2025-04-04 DIAGNOSIS — K63.5 POLYP OF ASCENDING COLON, UNSPECIFIED TYPE: ICD-10-CM

## 2025-04-04 DIAGNOSIS — L40.9 PSORIASIS: ICD-10-CM

## 2025-04-04 PROCEDURE — G0463 HOSPITAL OUTPT CLINIC VISIT: HCPCS | Performed by: INTERNAL MEDICINE

## 2025-04-04 RX ORDER — GUAIFENESIN 600 MG/1
1200 TABLET, EXTENDED RELEASE ORAL 2 TIMES DAILY
Qty: 30 TABLET | Refills: 2 | Status: SHIPPED | OUTPATIENT
Start: 2025-04-04

## 2025-04-04 RX ORDER — SODIUM CHLORIDE 9 MG/ML
20 INJECTION, SOLUTION INTRAVENOUS ONCE
OUTPATIENT
Start: 2025-04-04

## 2025-04-04 RX ORDER — OMEPRAZOLE 20 MG/1
40 TABLET, DELAYED RELEASE ORAL DAILY
Qty: 180 TABLET | Refills: 3 | Status: SHIPPED | OUTPATIENT
Start: 2025-04-04

## 2025-04-04 NOTE — PROGRESS NOTES
Chief Complaint  NEW PT - ONCOLOGY    Doreen Bingham APRN [place], Panfilo Beckford MD    Subjective          Luz Maria Burnett presents to [facility]EMATOLOGY [facility]Y for lung adenocarcinoma    Patient is a very pleasant 74-year-old female with past medical history of anxiety, nicotine dependence, psoriasis who presents for oncology evaluation regarding recent diagnosis of lung adenocarcinoma.  Patient had CT scan of chest that was low-dose for screening of lung cancer on 12 March ordered by her primary care doctor.  This showed a 10 cm mass in the right lower lobe with central necrosis as well as mediastinal adenopathy.  Also showed some lesions in the left upper lobe and left lower lobe.  She had biopsy via bronchoscopy on 3/25/25.  Pathology showed lung adenocarcinoma.  PD-L1 99%.  ROS and ALK negative.  PET scan on 26 March showed this to similar mass to be hypermetabolic and right lower lobe with hypermetabolic right pleural metastatic disease with hypermetabolic mediastinal right hilar adenopathy.  2 hypermetabolic nodules in the left lung measuring 8 mm to 1 cm could be metastatic disease.  Patient has already been made aware of these results.  Patient reports weight loss of about 40 pounds since September.  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  She thought a lot of the weight loss was due to  stress.  Patient reports several weeks to months of cough.  Cough is worth worse at night.  Reports it feels like she is choking.  Reports metallic taste in her mouth.  Reports with recent diagnosis she stopped her medications.  This included omeprazole.  This also includes methotrexate that she takes for psoriasis.  She has been on methotrexate for 30 years.  Psoriasis is well-controlled.  She has not had a flare since stopping methotrexate.  Patient agreeable to immunotherapy.    Oncology/Hematology History Overview Note   3/12/25: CT low dose of chest for lung cancer  screening:Ill-defined 10 cm mass is seen in the right lower lobe with central necrosis or cavitation. Mediastinal adenopathy. This may represent bronchogenic malignancy or possibly granulomatous disease. 1 cm cavitary lesion is seen in the left upper lobe on series 204 image 77. 1 cm solid lesion is seen in the left lower lobe on image 117    3/25/25: Biopsy via bronchoscopy: Pathology showing lung adenocarcinoma. ALK negative, ROS negative, PDL1 99%.     3/26/25: NM PET: 10 cm hypermetabolic necrotic mass in the right lower lobe with hypermetabolic right pleural metastatic disease and hypermetabolic mediastinal and right hilar adenopathy. This is consistent with malignancy.2 hypermetabolic nodules in the left lung measuring 8 mm and 1 cm. They may represent metastatic lesions or other primary malignancies. Minimal right pleural effusion. Moderate abdominal/pelvic free fluid. 9 mm hypermetabolic focus in the ascending colon. Suggest endoscopy to evaluate for a polyp/mass.     Malignant neoplasm of lower lobe of right lung   4/4/2025 Initial Diagnosis    Malignant neoplasm of lower lobe of right lung     4/4/2025 Cancer Staged    Staging form: Lung, AJCC V9  - Clinical: cT4, cN2, cM1 - Signed by Ruddy Rossi MD on 4/4/2025 4/4/2025 -  Chemotherapy    OP LUNG Pembrolizumab 200 mg           Review of Systems   Constitutional:  Positive for fatigue.   Respiratory:  Positive for shortness of breath.    Neurological:  Positive for weakness.   All other systems reviewed and are negative.    Current Outpatient Medications on File Prior to Visit   Medication Sig Dispense Refill    albuterol sulfate  (90 Base) MCG/ACT inhaler Inhale 2 puffs Every 4 (Four) Hours As Needed for Wheezing or Shortness of Air. 18 g 11    FLUoxetine (PROzac) 20 MG capsule Take 1 capsule by mouth Daily. (Patient not taking: Reported on 4/1/2025) 90 capsule 4    folic acid (FOLVITE) 1 MG tablet Take 1 tablet by mouth Daily.  (Patient not taking: Reported on 4/1/2025) 90 tablet 4    loperamide (Imodium A-D) 2 MG tablet Imodium A-D 2 mg oral tablet take 2 tablets by oral route bid   Active (Patient not taking: Reported on 4/1/2025)      methotrexate 2.5 MG tablet Take 4 tablets by mouth 1 (One) Time Per Week. (Patient not taking: Reported on 4/1/2025)      omeprazole OTC (PrilOSEC OTC) 20 MG EC tablet  (Patient not taking: Reported on 4/1/2025)      rosuvastatin (Crestor) 10 MG tablet Take 1 tablet by mouth Daily. (Patient not taking: Reported on 4/1/2025) 90 tablet 3     No current facility-administered medications on file prior to visit.       No Known Allergies  Past Medical History:   Diagnosis Date    Ankle pain, right     Cancer     skin cancers removed    Colon polyp 5/1/2000    have had 4 or 5 colonoscopy    COPD (chronic obstructive pulmonary disease)     Depression 1996    Foot pain, right     GERD (gastroesophageal reflux disease)     Irritable bowel syndrome 1985    Psoriasis      Past Surgical History:   Procedure Laterality Date    BRONCHOSCOPY N/A 3/25/2025    Procedure: BRONCHOSCOPY WITH ENDOBRONCHIAL ULTRASOUND: NEEDLE ASPIRATE, BAL, WASHINGS, BIOPSIES insertion of lighted instrument to view inside the lung and ultrasound examination with possible ultrasound-guided tissue removal for testing;  Surgeon: Suman Best MD;  Location: [facility] MAIN OR;  Service: Pulmonary;  Laterality: N/A;    COLONOSCOPY      COLONOSCOPY N/A 12/09/2021    Procedure: COLONOSCOPY WITH BIOPSY;  Surgeon: Mil Kennedy MD;  Location: [facility] ENDOSCOPY;  Service: General;  Laterality: N/A;  NORMAL COLONOSCOPY    EYE SURGERY  2010    cataracs    LAPAROSCOPIC TUBAL LIGATION      TUBAL ABDOMINAL LIGATION  1986     Social History     Socioeconomic History    Marital status:    Tobacco Use    Smoking status: Former     Current packs/day: 0.00     Average packs/day: 0.6 packs/day for 77.8 years (50.3 ttl pk-yrs)     Types: Cigarettes      "Start date:      Quit date:      Years since quittin.2    Smokeless tobacco: Never    Tobacco comments:     Quit 4-5 weeks ago    Vaping Use    Vaping status: Never Used   Substance and Sexual Activity    Alcohol use: Not Currently     Comment: no current use    Drug use: Never    Sexual activity: Defer     Partners: Male     Birth control/protection: Post-menopausal, None     Family History   Problem Relation Age of Onset    Heart disease Father     Diabetes Father     Early death Father     Hearing loss Father     Hyperlipidemia Father     Arthritis Mother     Asthma Mother     Cancer Brother     Diabetes Brother         Type II    Cancer Brother     Diabetes Brother         Type II    Hearing loss Brother     Diabetes Brother         Type II    Malig Hyperthermia Neg Hx        Objective   Physical Exam  Well appearing patient in no acute distress on RA  Anicteric sclerae, no rash on exposed skin  Respirations non-labored  Awake, alert, and oriented x 4. Speech intact. No gross neurologic deficit  Appropriate mood and affect    Vitals:    25 1318   BP: 101/47   Pulse: 109   Resp: 18   Temp: 98.4 °F (36.9 °C)   TempSrc: Temporal   SpO2: 96%   Weight: 47.1 kg (103 lb 13.4 oz)   Height: 167.6 cm (65.98\")   PainSc: 0-No pain               PHQ-9 Total Score:                      Result Review :   The following data was reviewed by: Ruddy Rossi MD on 25:  Lab Results   Component Value Date    HGB 13.1 2022    HCT 38.8 2022    MCV 99.0 (H) 2022     2022    WBC 6.45 2022    NEUTROABS 4.36 2022    LYMPHSABS 1.50 2022    MONOSABS 0.39 2022    EOSABS 0.12 2022    BASOSABS 0.05 2022     Lab Results   Component Value Date    GLUCOSE 106 (H) 2022    BUN 12 2022    CREATININE 0.80 2025     2022    K 4.5 2022     2022    CO2 27.3 2022    CALCIUM 9.1 2022    PROTEINTOT 6.5 " 04/05/2022    ALBUMIN 4.00 04/05/2022    BILITOT 0.7 04/05/2022    ALKPHOS 146 (H) 04/05/2022    AST 31 04/05/2022    ALT 7 04/05/2022     Lab Results   Component Value Date    TSH 2.780 04/05/2022            PET personally reviewed and per my independent read with large RLL mass with necrosis, adenopathy that is hypermetabolic, right pleural lesions and 2 small lesions left lung    MRI Brain With & Without Contrast  Result Date: 4/2/2025  Impression: 1.No acute ischemic change. 2.No abnormal postcontrast enhancement to suggest metastatic disease. 3.Moderate white matter changes which are nonspecific but most compatible small vessel ischemic disease in this age group. 4.Minimal paranasal sinus disease. Electronically Signed: Marino Miller MD  4/2/2025 1:01 PM EDT  Workstation ID: [id number]    NM PET/CT Skull Base to Mid Thigh  Result Date: 3/29/2025  Impression: 1. 10 cm hypermetabolic necrotic mass in the right lower lobe with hypermetabolic right pleural metastatic disease and hypermetabolic mediastinal and right hilar adenopathy. This is consistent with malignancy. 2. 2 hypermetabolic nodules in the left lung measuring 8 mm and 1 cm. They may represent metastatic lesions or other primary malignancies. 3. Minimal right pleural effusion. Moderate abdominal/pelvic free fluid. 4. 9 mm hypermetabolic focus in the ascending colon. Suggest endoscopy to evaluate for a polyp/mass. Electronically Signed: Champ Pandya MD  3/29/2025 3:59 PM EDT  Workstation ID: [id number]    CT Chest Low Dose Cancer Screening WO  Result Date: 3/16/2025  Impression: Low-dose screening CT scan of the chest demonstrating marked emphysema. Ill-defined 10 cm mass is seen in the right lower lobe with central necrosis or cavitation. Mediastinal adenopathy. This may represent bronchogenic malignancy or possibly granulomatous disease. 1 cm cavitary lesion is seen in the left upper lobe on series 204 image 77. 1 cm solid lesion is seen in the left  lower lobe on image 117. Recommendation: 4X Lung Rads Assessment: Lung-RADS L4XS - Very suspicious, >15% chance of malignancy; there is a clinically significant or potentially significant non-lung cancer finding. Electronically Signed: Bernardino Smith MD  3/16/2025 2:18 PM EDT  Workstation ID: [id number]          Assessment and Plan    Diagnoses and all orders for this visit:    1. Malignant neoplasm of lower lobe of right lung (Primary)  -     Ambulatory Referral to ONC Social Work  -     CBC and Differential; Future  -     Comprehensive metabolic panel; Future  -     TSH; Future  -     T4, free; Future  -     Lab Appointment Request; Future  -     Clinic Appointment Request; Future  -     Infusion Appointment Request 3; Future    2. Psoriasis    3. Polyp of ascending colon, unspecified type    Other orders  -     omeprazole OTC (PrilOSEC OTC) 20 MG EC tablet; Take 2 tablets by mouth Daily.  Dispense: 180 tablet; Refill: 3  -     guaiFENesin (Mucinex) 600 MG 12 hr tablet; Take 2 tablets by mouth 2 (Two) Times a Day.  Dispense: 30 tablet; Refill: 2  -     sodium chloride 0.9 % infusion  -     Pembrolizumab (KEYTRUDA) 200 mg in sodium chloride 0.9 % 58 mL chemo IVPB      Metastatic lung adenocarcinoma  Initially found on screening low-dose CT chest.  10 cm right lower lobe mass with metastasis to right pleura and likely metastasis to left lung however lung lesion too small to biopsy.  No disease in abdomen or pelvis. MRI brain negative. ROS and ALK negative.  EGFR pending.  PD-L1 fortunately positive at 99%.  Patient qualifies for immunotherapy with Keytruda or pembrolizumab 200 mg IV every 3 weeks alone.  Discussed that she does have autoimmune condition (psoriasis) however it is well-controlled at this time; therefore the benefit to treating her life-threatening cancer with immunotherapy outweighs the risk. Further patient desires to avoid chemotherapy and this would be the treatment option if immunotherapy is not  pursued.  Risk-benefit side effect profile including immune related side effects discussed with patient.  Will attempt to start soon as possible.  Will see patient back with cycle 2.  Plan for repeat scans in 3 months.    Psoriasis  Patient counseled on risk of autoimmune flare while on immunotherapy.  As her psoriasis is well-controlled and she can resume methotrexate the benefit of pembrolizumab to treat life-threatening disease outweighs the risk.  Certainly we can treat psoriasis flares with steroids if needed.  Often patients with autoimmune conditions do not get flares of their condition while on immunotherapy.    Ascending colon lesion  Per PET. Will need colonoscopy but recommend to complete after a few rounds of immunotherapy as above cancer takes precedence.     This is an acute or chronic illness that poses a threat to life or bodily function. The above treatment plan involves a high risk of complications and/or mortality of patient management.    The patient was seen and examined. Work by the provider also included review and/or ordering of lab tests, review and/or ordering of radiology tests, review and/or ordering of medicine tests, discussion with other physicians or providers, independent review of data, obtaining old records, review/summation of old records, and/or other review.     I have reviewed the family history, social history, and past medical history for this patient. Previous information and data has been reviewed and updated as needed. I have reviewed and verified the chief complaint, history, and other documentation. The patient was interviewed and examined at the bedside and the chart reviewed. The previous observations, recommendations, and conclusions were reviewed including those of other providers.     The plan was discussed with the patient and/or family. The patient was given time to ask questions and these questions were answered. At the conclusion of their visit they had no  additional questions or concerns and all questions were answered to their satisfaction.          Patient Follow Up: Cycle 2  Patient was given instructions and counseling regarding her condition or for health maintenance advice. Please see specific information pulled into the AVS if appropriate.

## 2025-04-07 LAB
CYTO UR: NORMAL
LAB AP CASE REPORT: NORMAL
LAB AP CLINICAL INFORMATION: NORMAL
LAB AP DIAGNOSIS COMMENT: NORMAL
LAB AP SPECIAL STAINS: NORMAL
Lab: NORMAL
PATH REPORT.ADDENDUM SPEC: NORMAL
PATH REPORT.FINAL DX SPEC: NORMAL
PATH REPORT.GROSS SPEC: NORMAL

## 2025-04-07 NOTE — PROGRESS NOTES
"PHARMACY C1D1 PRE VISIT ASSESSMENT    Patient will present for C1D1 of pembrolizumab 200 mg Q21D x 17 cycles     74 y.o. female  Estimated body surface area is 1.51 meters squared as calculated from the following:    Height as of 4/4/25: 167.6 cm (65.98\").    Weight as of 4/4/25: 47.1 kg (103 lb 13.4 oz).    Past Medical History:   Diagnosis Date    Ankle pain, right     Cancer     skin cancers removed    Colon polyp 5/1/2000    have had 4 or 5 colonoscopy    COPD (chronic obstructive pulmonary disease)     Depression 1996    Foot pain, right     GERD (gastroesophageal reflux disease)     Irritable bowel syndrome 1985    Psoriasis        Oncology/Hematology History Overview Note   3/12/25: CT low dose of chest for lung cancer screening:Ill-defined 10 cm mass is seen in the right lower lobe with central necrosis or cavitation. Mediastinal adenopathy. This may represent bronchogenic malignancy or possibly granulomatous disease. 1 cm cavitary lesion is seen in the left upper lobe on series 204 image 77. 1 cm solid lesion is seen in the left lower lobe on image 117    3/25/25: Biopsy via bronchoscopy: Pathology showing lung adenocarcinoma. ALK negative, ROS negative, PDL1 99%.     3/26/25: NM PET: 10 cm hypermetabolic necrotic mass in the right lower lobe with hypermetabolic right pleural metastatic disease and hypermetabolic mediastinal and right hilar adenopathy. This is consistent with malignancy.2 hypermetabolic nodules in the left lung measuring 8 mm and 1 cm. They may represent metastatic lesions or other primary malignancies. Minimal right pleural effusion. Moderate abdominal/pelvic free fluid. 9 mm hypermetabolic focus in the ascending colon. Suggest endoscopy to evaluate for a polyp/mass.     Malignant neoplasm of lower lobe of right lung   4/4/2025 Initial Diagnosis    Malignant neoplasm of lower lobe of right lung     4/4/2025 Cancer Staged    Staging form: Lung, AJCC V9  - Clinical: cT4, cN2, cM1 - Signed by " Ruddy Rossi MD on 4/4/2025 4/4/2025 -  Chemotherapy    OP LUNG Pembrolizumab 200 mg         Relevant Pathology:   Final Diagnosis   1. Lymph node, station 7, FNA:               - Positive for malignant cells               - Metastatic adenocarcinoma, lung primary         2. Lymph node, station 4R, FNA:               - Positive for malignant cells               - Metastatic adenocarcinoma, lung primary         3. Lymph node, station 10R, FNA:               - Positive for malignant cells               - Metastatic adenocarcinoma, lung primary         4. Lung, right lower lobe, BAL:               - Negative for malignant cells       Electronically signed by Irma Lucero DO on 3/31/2025 at 1118 EDT     Final Diagnosis   Lung, right lower lobe mass, core biopsy:               - Adenocarcinoma, lung primary      The above positive (malignant) diagnosis was called to Kayla Webber RN in Dr. MELVA Best's office at 14:36 EDT on 3/27/2025 by Maria Luz KIMBROUGH    Electronically signed by Irma Lucero DO on 3/27/2025 at 1441 EDT           Potentially Actionable Mutation Present: YES PD-L1 99%      Relevant Imaging:    MRI Brain With & Without Contrast  Result Date: 4/2/2025  Impression: 1.No acute ischemic change. 2.No abnormal postcontrast enhancement to suggest metastatic disease. 3.Moderate white matter changes which are nonspecific but most compatible small vessel ischemic disease in this age group. 4.Minimal paranasal sinus disease. Electronically Signed: Marino Miller MD  4/2/2025 1:01 PM EDT  Workstation ID: [id number]    NM PET/CT Skull Base to Mid Thigh  Result Date: 3/29/2025  Impression: 1. 10 cm hypermetabolic necrotic mass in the right lower lobe with hypermetabolic right pleural metastatic disease and hypermetabolic mediastinal and right hilar adenopathy. This is consistent with malignancy. 2. 2 hypermetabolic nodules in the left lung measuring 8 mm and 1 cm. They may represent metastatic  lesions or other primary malignancies. 3. Minimal right pleural effusion. Moderate abdominal/pelvic free fluid. 4. 9 mm hypermetabolic focus in the ascending colon. Suggest endoscopy to evaluate for a polyp/mass. Electronically Signed: Champ Pandya MD  3/29/2025 3:59 PM EDT  Workstation ID: [id number]    CT Chest Low Dose Cancer Screening WO  Result Date: 3/16/2025  Impression: Low-dose screening CT scan of the chest demonstrating marked emphysema. Ill-defined 10 cm mass is seen in the right lower lobe with central necrosis or cavitation. Mediastinal adenopathy. This may represent bronchogenic malignancy or possibly granulomatous disease. 1 cm cavitary lesion is seen in the left upper lobe on series 204 image 77. 1 cm solid lesion is seen in the left lower lobe on image 117. Recommendation: 4X Lung Rads Assessment: Lung-RADS L4XS - Very suspicious, >15% chance of malignancy; there is a clinically significant or potentially significant non-lung cancer finding. Electronically Signed: Bernardino Smith MD  3/16/2025 2:18 PM EDT  Workstation ID: [id number]       Current treatment regimen has insurance authorization approval? NOPending    Medication treatment plan entered is appropriate per NCCN Guidelines    Pharmacy Follow-up Considerations: Patient with metastatic lung adenocarcinoma. Plan to initiate pembrolizumab. Pharmacy will continue to monitor labs throughout treatment and will  patient prior to infusion on C1D1.   Edmar Castillo, PharmD   04/07/25

## 2025-04-08 NOTE — TELEPHONE ENCOUNTER
Caller: RHIANNON MONTESINOS     Relationship: [employer]     Best call back number: [phone]    What is your medical concern? PATIENT'S  IS CALLING TO GET GUIDANCE ON WHAT PATIENT SHOULD DO. HE MENTIONED THAT SHE HAD A BUNCH OF TESTS DONE LAST WEEK AND SINCE THEN, SHE HAS BEEN VERY WEAK, NOT EATING MUCH AT ALL, HAS A COUGH, WEIGHT LOSS, AND IT IS WORSENING OVER TIME.

## 2025-04-09 PROBLEM — J96.01 ACUTE HYPOXIC RESPIRATORY FAILURE: Status: ACTIVE | Noted: 2025-01-01

## 2025-04-09 PROBLEM — E43 SEVERE PROTEIN-CALORIE MALNUTRITION: Status: ACTIVE | Noted: 2025-01-01

## 2025-04-09 NOTE — NURSING NOTE
Patient Luz Maria Burnett admitted to 4MTU from the ED. Patient is alert and oriented to person, place, time, and situation. Patient oriented to unit, call light system and bed alarm use, teaching effective. Patient agreed to bed alarm use. Candida Auris screening revealed patient will NOT need tested, contact isolation and bleach cleaning due to no risk factors. Patient currently is not a concern for an active CDIFF infection.    4 eyes skin assessment completed with Michelle Pickering RN. Per policy, the following skin interventions were put in place as a result of the skin assessment below: None - Samuel is greater than 18 and no skin issues noted        Fall assessment completed. Per policy, the patient was determined be a High fall risk due to Vargas Shun score 15 or greater (only used within the first 24 hours of admission). Patient assessed to need the following mobility assistance: Standby Assist with the following assistive devices: Walker, and will be using a bedside commode for toileting. Per policy, the following fall interventions were put in place: Bed Alarm and Room close to nurse's station.     Patient's belongings that were sent with family: None  Patient's belongings that were sent to security: None  Patient's belongings locked in safe box in room: None  Patient's belongings that were sent to pharmacy: None  Patient's belongings kept at bedside per patient: Purse/Wallet, Glasses, and Clothing

## 2025-04-09 NOTE — CONSULTS
[facility]   Consult Note    Patient Name: Luz Maria Burnett  : 1950  MRN: [id number]  Primary Care Physician:  Panfilo Ward MD  Referring Physician: No ref. provider found  Date of admission: 2025    Inpatient Pulmonology Consult  Consult performed by: Ace Mcdaniel DO  Consult ordered by: Rolando Candelaria MD        Subjective   Subjective     Reason for Consult/ Chief Complaint: Shortness of breath    History of Present Illness  Luz Maria Burnett is a 74 y.o. female with past medical history of static adenocarcinoma of the right lung who is currently being seen by Dr. Tarango.  In the ED, BNP was elevated at 2135.  ABG was compensated.  Patient also had lactic acidosis, elevated procalcitonin, and leukocytosis.  CT angio demonstrated no evidence of pulmonary embolism.  However it does note an 11 cm necrotic mass in the center inferior right thorax as well as metastatic left-sided pulmonary nodules and concern for postobstructive pneumonia with questionable lymphangitic spread.  This service is consulted to assist in management during the patient's acute issues.  Upon assessment, patient lying in bed to nasal cannula and approach her chest.  Findings plan was To the patient.  Patient stated that she has been getting over pneumonia and so she has not started treatment yet.  Patient states that she supposed to start immunotherapy but is unable to at this time.  Will consult radiation oncology to see if patient can receive treatment while in the hospital.    Review of Systems     Personal History     Past Medical History:   Diagnosis Date    Ankle pain, right     Cancer     skin cancers removed, R lung    Colon polyp 2000    have had 4 or 5 colonoscopy    COPD (chronic obstructive pulmonary disease)     Depression     Foot pain, right     GERD (gastroesophageal reflux disease)     Irritable bowel syndrome     Psoriasis        Past Surgical History:   Procedure Laterality Date     BRONCHOSCOPY N/A 3/25/2025    Procedure: BRONCHOSCOPY WITH ENDOBRONCHIAL ULTRASOUND: NEEDLE ASPIRATE, BAL, WASHINGS, BIOPSIES insertion of lighted instrument to view inside the lung and ultrasound examination with possible ultrasound-guided tissue removal for testing;  Surgeon: Suman Best MD;  Location: [facility] MAIN OR;  Service: Pulmonary;  Laterality: N/A;    COLONOSCOPY      COLONOSCOPY N/A 12/09/2021    Procedure: COLONOSCOPY WITH BIOPSY;  Surgeon: Mil Kennedy MD;  Location: [facility] ENDOSCOPY;  Service: General;  Laterality: N/A;  NORMAL COLONOSCOPY    EYE SURGERY  2010    cataracs    LAPAROSCOPIC TUBAL LIGATION      TUBAL ABDOMINAL LIGATION  1986       Family History: family history includes Arthritis in her mother; Asthma in her mother; Cancer in her brother and brother; Diabetes in her brother, brother, brother, and father; Early death in her father; Hearing loss in her brother and father; Heart disease in her father; Hyperlipidemia in her father. Otherwise pertinent FHx was reviewed and not pertinent to current issue.    Social History:  reports that she quit smoking about 3 months ago. Her smoking use included cigarettes. She started smoking about 55 years ago. She has a 50.3 pack-year smoking history. She has never used smokeless tobacco. She reports that she does not currently use alcohol. She reports that she does not use drugs.    Home Medications:   FLUoxetine, folic acid, guaiFENesin, loperamide, methotrexate, omeprazole OTC, and rosuvastatin    Allergies:  No Known Allergies    Objective    Objective     Vitals:  Temp:  [98 °F (36.7 °C)-98.7 °F (37.1 °C)] 98.7 °F (37.1 °C)  Heart Rate:  [] 87  Resp:  [16-19] 18  BP: ()/(48-55) 97/53  Flow (L/min) (Oxygen Therapy):  [2-4] 2    Physical Exam  Frail chronically ill-appearing  Scattered rhonchi      Result Review    Result Review:  I have personally reviewed the results from the time of this admission to 4/9/2025 16:04 EDT  and agree with these findings:  [x]  Laboratory  [x]  Microbiology  [x]  Radiology  [x]  EKG/Telemetry   [x]  Cardiology/Vascular   [x]  Pathology  []  Old records  []  Other:    Most notable findings include: CT scan showing large post obstructive cavitary pneumonia    Assessment & Plan   Assessment / Plan     Brief Patient Summary:  Luz Maria Burnett is a 74 y.o. female who presents with postobstructive pneumonia and known history of adenocarcinoma of the right lung    Active Hospital Problems:  Active Hospital Problems    Diagnosis     **Acute hypoxic respiratory failure        Plan:   CT scan reviewed  Patient with very large central obstructing mass causing significant postobstructive pneumonia  Explained to the patient at this time that we do not have time to wait to give her immunotherapy she needs to have cytotoxic chemotherapy and possible radiation therapy to help alleviate this mechanical obstruction  Explained to her that she will not get any improvement and will ultimately wind up dying within a very short period of time without aggressive treatment and alleviation of this obstruction  In the meantime antibiotics will work but this is not a fix  We will continue with IV antibiotics  Agree with palliative care consult  Sputum culture if possible  No need for bronchoscopy  Will start LABA LAMA annual corticosteroids  Electronically signed by WEN Calvillo, 04/09/25, 4:22 PM EDT.    I personally seen  examined this patient and the medical decision making and assessment and plan reflect my and I assume responsibility for the care of this patient  Electronically signed by Ace Mcdaniel DO, 04/09/25, 4:04 PM EDT.

## 2025-04-09 NOTE — CONSULTS
Purpose of the visit was to evaluate for: goals of care/advanced care planning. Spoke with MD, RN, patient, and family and discussed palliative care, goals of care, care options, and resuscitation status.      Assessment: Mrs. Burnett has a medical history of recently diagnosed lung cancer, COPD, GERD, depression, psoriasis presented to the ED complaining of shortness of breath.  Patient reports that she was diagnosed about 2 weeks ago with lung adenocarcinoma and about 5 days ago she started experiencing worsening shortness of breath, she also reports a dry cough, also patient reports that she feels very thirsty and has not eaten anything for few days due to lack of appetite, she endorses unintentional weight loss.  Denies any chest pain, focal weakness, vision changes, nausea, vomiting, diarrhea, chills or fever, constipation.  In the ER patient noted to have a white blood cell count of 33, troponin 19, BNP over 2000, chest x-ray concerning for necrotic mass and possible superimposed consolidation, patient received vancomycin and cefepime, also received fluids. Pt resting in bed with sister in law and brother at bedside. Provided water and kleenex as requested by sister in law prior to consult. Introduced self and palliative care services. Discussed pt understanding of cancer and treatments. During discussion it was apparent pt may be confused on if treatment would be palliative or possible remission. Provided support. Discussed pt overall functioning level at home. Pt reports she has been laying in bed more recently as she feels short of breath and exhaust quickly. Pt also reports in past she had been struggling to eat due to coughing spells. However pt reports that she is eating better and is actually looking forward to lunch. Discussed code status at which point pt confirmed CODE STATUS-DNR/DNI. Pt had no questions but plan is to clarify what treatments would be for cancer. MD aware, orders placed and looking into  clarification for pt. Palliative care discussed [facility] will hold off completing until treatments are clarified. MD and primary RN aware. Palliative care will continue to follow.     Recommendations/Plan:  pending clinical course .    Tasks Completed: Code Status clarification and Emotional Support.      Chen DUNCAN RN  Palliative Care

## 2025-04-09 NOTE — CONSULTS
In Patient New Consult      Encounter Date: 4/9/2025   Patient Name: Luz Maria Burnett  YOB: 1950   Medical Record Number: [id number]   Primary Diagnosis: Primary adenocarcinoma of the right lower lobe   Cancer Staging: Cancer Staging   Malignant neoplasm of lower lobe of right lung  Staging form: Lung, AJCC V9  - Clinical: cT4, cN2, cM1 - Signed by Ruddy Rossi MD on 4/4/2025      History of Present Illness: Luz Maria Burnett is a 74-year-old female with metastatic adenocarcinoma of the right lung who is seen in consultation regarding radiotherapy as a component of palliative care.  Mrs. Burnett has experienced progressive dyspnea on exertion for months.  She underwent CT scan of the chest by her primary care provider.  Ill-defined 10 cm mass in the right lower lobe with a 4 cm area of central cavitation containing debris.  Additionally, there was mediastinal adenopathy with a bulky subcarinal mass measuring 4.5 x 3 cm.  A precarinal lymph node measured 2.2 x 1.4 cm.  PET/CT performed on 3/26/2025 revealed a 10 cm mass in the right lower lobe as well as 2 hypermetabolic nodules in the left lung potentially representing metastatic disease or other primaries.  She recently presented to [facility] with profound dyspnea and was in hypoxic respiratory failure with apparent superimposed pneumonia.  She reports feeling markedly better since being admitted.  She does report 1 episode of hemoptysis 3 weeks ago when she did not appreciate bright red blood.  Recently, she reports only brownish tinged sputum.    Subjective     Review of Systems:   As per HPI      Past Medical History:   Past Medical History:   Diagnosis Date    Ankle pain, right     Cancer     skin cancers removed, R lung    Colon polyp 5/1/2000    have had 4 or 5 colonoscopy    COPD (chronic obstructive pulmonary disease)     Depression 1996    Foot pain, right     GERD (gastroesophageal reflux disease)     Irritable bowel syndrome  1985    Psoriasis        Past Surgical History:   Past Surgical History:   Procedure Laterality Date    BRONCHOSCOPY N/A 3/25/2025    Procedure: BRONCHOSCOPY WITH ENDOBRONCHIAL ULTRASOUND: NEEDLE ASPIRATE, BAL, WASHINGS, BIOPSIES insertion of lighted instrument to view inside the lung and ultrasound examination with possible ultrasound-guided tissue removal for testing;  Surgeon: Suman Best MD;  Location: [facility] MAIN OR;  Service: Pulmonary;  Laterality: N/A;    COLONOSCOPY      COLONOSCOPY N/A 2021    Procedure: COLONOSCOPY WITH BIOPSY;  Surgeon: Mil Kennedy MD;  Location: [facility] ENDOSCOPY;  Service: General;  Laterality: N/A;  NORMAL COLONOSCOPY    EYE SURGERY      cataracs    LAPAROSCOPIC TUBAL LIGATION      TUBAL ABDOMINAL LIGATION         Family History:   Family History   Problem Relation Age of Onset    Heart disease Father     Diabetes Father     Early death Father     Hearing loss Father     Hyperlipidemia Father     Arthritis Mother     Asthma Mother     Cancer Brother     Diabetes Brother         Type II    Cancer Brother     Diabetes Brother         Type II    Hearing loss Brother     Diabetes Brother         Type II    Malig Hyperthermia Neg Hx        Social History:   Social History     Socioeconomic History    Marital status:    Tobacco Use    Smoking status: Former     Current packs/day: 0.00     Average packs/day: 0.6 packs/day for 77.8 years (50.3 ttl pk-yrs)     Types: Cigarettes     Start date:      Quit date:      Years since quittin.2    Smokeless tobacco: Never    Tobacco comments:     Quit 4-5 weeks ago    Vaping Use    Vaping status: Never Used   Substance and Sexual Activity    Alcohol use: Not Currently     Comment: no current use    Drug use: Never    Sexual activity: Defer     Partners: Male     Birth control/protection: Post-menopausal, None       Medications:     Current Facility-Administered Medications:     acetaminophen (TYLENOL)  tablet 650 mg, 650 mg, Oral, Q6H PRN, Rolando Candelaria MD    albuterol (PROVENTIL) nebulizer solution 0.083% 2.5 mg/3mL, 2.5 mg, Nebulization, Q6H PRN, Rolando Candelaria MD    aluminum-magnesium hydroxide-simethicone (MAALOX MAX) 400-400-40 MG/5ML suspension 15 mL, 15 mL, Oral, Q6H PRN, Rolando Candelaria MD    arformoterol (BROVANA) nebulizer solution 15 mcg, 15 mcg, Nebulization, BID - RT, Ruddy Cai APRN, 15 mcg at 04/09/25 0900    benzocaine-menthol (CHLORASEPTIC) lozenge 1 each, 1 lozenge, Mouth/Throat, Q2H PRN, Rolando Candelaria MD    sennosides-docusate (PERICOLACE) 8.6-50 MG per tablet 2 tablet, 2 tablet, Oral, BID PRN **AND** polyethylene glycol (MIRALAX) packet 17 g, 17 g, Oral, Daily PRN **AND** bisacodyl (DULCOLAX) EC tablet 5 mg, 5 mg, Oral, Daily PRN **AND** bisacodyl (DULCOLAX) suppository 10 mg, 10 mg, Rectal, Daily PRN, Maurice Shelley MD    budesonide (PULMICORT) nebulizer solution 0.5 mg, 0.5 mg, Nebulization, BID - RT, Ruddy Cai APRN, 0.5 mg at 04/09/25 0900    cefepime 1000 mg IVPB in 100 mL NS (VTB), 1,000 mg, Intravenous, Q12H, Maurice Shelley MD, 1,000 mg at 04/09/25 1251    Diclofenac Sodium (VOLTAREN) 1 % gel 2 g, 2 g, Topical, 4x Daily PRN, Rolando Candelaria MD    enoxaparin sodium (LOVENOX) syringe 30 mg, 30 mg, Subcutaneous, Daily, Maurice Shelley MD, 30 mg at 04/09/25 0941    guaiFENesin (MUCINEX) 12 hr tablet 1,200 mg, 1,200 mg, Oral, BID, Maurice Shelley MD, 1,200 mg at 04/09/25 0941    hydrOXYzine (ATARAX) tablet 25 mg, 25 mg, Oral, TID PRN, Rolando Candelaria MD    Lidocaine (Anorectal) (LMX 5) 5 % cream cream 1 Application, 1 Application, Topical, TID PRN, Rolando Candelaria MD    Lidocaine 4 % 1 patch, 1 patch, Transdermal, Daily PRN, Rolando Candelaria MD    melatonin tablet 5 mg, 5 mg, Oral, Nightly PRN, Rolando Candelaria MD    nicotine (NICODERM CQ) 21 MG/24HR patch 1 patch, 1 patch, Transdermal, Daily PRN, Rolando Candelaria MD    ondansetron (ZOFRAN) injection  4 mg, 4 mg, Intravenous, Q6H PRN, Maurice Shelley MD    ondansetron (ZOFRAN) injection 4 mg, 4 mg, Intravenous, Q4H PRN, Rolando Candelaria MD    pantoprazole (PROTONIX) EC tablet 40 mg, 40 mg, Oral, Q AM, Maurice Shelley MD, 40 mg at 04/09/25 0512    Pharmacy to dose vancomycin, , Not Applicable, Continuous PRN, Rolando Candelaria MD    sodium chloride 0.9 % flush 10 mL, 10 mL, Intravenous, PRN, Maurice Shelley MD    sodium chloride 0.9 % flush 10 mL, 10 mL, Intravenous, PRN, Maurice Shelley MD    sodium chloride 0.9 % flush 10 mL, 10 mL, Intravenous, Q12H, Maurice Shelley MD, 10 mL at 04/09/25 0942    sodium chloride 0.9 % flush 10 mL, 10 mL, Intravenous, PRN, Maurice Shelley MD    sodium chloride 0.9 % infusion 40 mL, 40 mL, Intravenous, PRN, Maurice Shelley MD    vancomycin (VANCOCIN) 1,000 mg in sodium chloride 0.9 % 250 mL IVPB-VTB, 1,000 mg, Intravenous, Q24H, Rolando Candelaria MD, Last Rate: 250 mL/hr at 04/09/25 0942, 1,000 mg at 04/09/25 0942    Allergies:   No Known Allergies    Pain: (on a scale of 0-10) There were no vitals filed for this visit.    ECOG: (3) Capable of Limited Self Care, Confined to Bed or Chair Greater Than 50% of Waking Hours  Quality of Life:  10 - 100% Bedridden - Some Self Care  Advanced Care Plan: [unfilled]     Objective     Physical Exam:   Vital Signs: Temp:  [98 °F (36.7 °C)-98.7 °F (37.1 °C)] 98.7 °F (37.1 °C)  Heart Rate:  [] 87  Resp:  [16-19] 18  BP: ()/(48-55) 97/53   Body mass index is 17.05 kg/m².       04/08/25  2141 04/09/25  0345   Weight: 47.1 kg (103 lb 13.4 oz) 47.9 kg (105 lb 9.6 oz)        Physical Exam  Constitutional:       General: She is not in acute distress.     Appearance: She is not toxic-appearing or diaphoretic.      Comments: Thin   HENT:      Head: Normocephalic and atraumatic.   Pulmonary:      Comments: Nasal cannula in place; visibly mildly dyspneic  Skin:     General: Skin is warm and dry.      Coloration: Skin is not  jaundiced.   Neurological:      General: No focal deficit present.      Mental Status: She is oriented to person, place, and time.      Cranial Nerves: No cranial nerve deficit.   Psychiatric:         Mood and Affect: Mood normal.         Behavior: Behavior normal.         Thought Content: Thought content normal.         Judgment: Judgment normal.         Results:   Radiographs: CT Angiogram Chest  Result Date: 4/9/2025  1.  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 2.  No definite acute pulmonary embolism is seen. 3.  Again, there is a very large (11 cm) necrotic mass centered in the inferior right thorax, predominantly involving the lower lobe of the right lung. There is associated pleural-based metastatic disease. 4.  Metastatic left-sided pulmonary nodules are also suggested. 5.  No thoracic aortic aneurysm or dissection. 6.  Postobstructive pneumonia on the right may be present. Lymphangitic spread of tumor is possible. 7.  Severe emphysematous changes involve the lungs. 8.  Additional, findings are as described in prior recent exam reports. 9.  Please see above comments for further detail.   Portions of this note were completed with a voice recognition program.  4/9/2025 1:25 AM by Seun Antonio MD on Workstation: [id number]      XR Chest 1 View  Result Date: 4/8/2025  Impression: Consolidation of the right mid to lower lung, the majority of which likely represents known necrotic malignancy. Superimposed infection is not excluded. Left lung pulmonary nodules better seen on prior PET/CT. Electronically Signed: Rehan Harp  4/8/2025 10:08 PM EDT  Workstation ID: [id number]    MRI Brain With & Without Contrast  Result Date: 4/2/2025  Impression: 1.No acute ischemic change. 2.No abnormal postcontrast enhancement to suggest metastatic disease. 3.Moderate white matter changes which are nonspecific but most compatible small vessel ischemic disease in  this age group. 4.Minimal paranasal sinus disease. Electronically Signed: Marino Miller MD  4/2/2025 1:01 PM EDT  Workstation ID: [id number]    NM PET/CT Skull Base to Mid Thigh  Result Date: 3/29/2025  Impression: 1. 10 cm hypermetabolic necrotic mass in the right lower lobe with hypermetabolic right pleural metastatic disease and hypermetabolic mediastinal and right hilar adenopathy. This is consistent with malignancy. 2. 2 hypermetabolic nodules in the left lung measuring 8 mm and 1 cm. They may represent metastatic lesions or other primary malignancies. 3. Minimal right pleural effusion. Moderate abdominal/pelvic free fluid. 4. 9 mm hypermetabolic focus in the ascending colon. Suggest endoscopy to evaluate for a polyp/mass. Electronically Signed: Champ Pandya MD  3/29/2025 3:59 PM EDT  Workstation ID: [id number]    CT Chest Low Dose Cancer Screening WO  Result Date: 3/16/2025  Impression: Low-dose screening CT scan of the chest demonstrating marked emphysema. Ill-defined 10 cm mass is seen in the right lower lobe with central necrosis or cavitation. Mediastinal adenopathy. This may represent bronchogenic malignancy or possibly granulomatous disease. 1 cm cavitary lesion is seen in the left upper lobe on series 204 image 77. 1 cm solid lesion is seen in the left lower lobe on image 117. Recommendation: 4X Lung Rads Assessment: Lung-RADS L4XS - Very suspicious, >15% chance of malignancy; there is a clinically significant or potentially significant non-lung cancer finding. Electronically Signed: Bernardino Smith MD  3/16/2025 2:18 PM EDT  Workstation ID: [id number]  I personally reviewed the PET/CT performed on 3/26/2025 as well as the CT scan of the chest performed on 4/9/2025.  The pertinent findings are as above.      Pathology: I personally reviewed the pathology report from the procedure performed on 3/25/2025.  This revealed adenocarcinoma.    Labs:   WBC   Date Value Ref Range Status   04/09/2025 24.83 (H) 3.40  - 10.80 10*3/mm3 Final     Hemoglobin   Date Value Ref Range Status   04/09/2025 10.5 (L) 12.0 - 15.9 g/dL Final     Hematocrit   Date Value Ref Range Status   04/09/2025 33.9 (L) 34.0 - 46.6 % Final     Platelets   Date Value Ref Range Status   04/09/2025 161 140 - 450 10*3/mm3 Final       Assessment / Plan      Assessment/Plan:   Luz Maria Burnett is a 74-year-old female with cT4 cN2 cM1 adenocarcinoma of the right lower lobe.  She is currently admitted for hypoxic respiratory failure as a relates to right lower lobe obstruction and pneumonia. ECOG 3    I discussed the clinical, radiographic and pathologic findings to date with Mrs. Burnett.  I explained the role of radiotherapy in the palliation of locally advanced lung cancer, outlining the rationale for the delivery of external beam radiotherapy to primary pulmonary malignancy.  I explained that the goal of radiotherapy in her case would be to prevent progression of disease, bridging her to systemic therapy.  Without radiotherapy, the likelihood of a catastrophic outcome between now and the time she is eligible to receive chemotherapy is higher.  I recommended radiotherapy to the right lung mass.  Mrs. Burnett is agreeable to this recommendation and will undergo CT simulation for treatment planning purposes today      Ace Oliveira MD  Radiation Oncology  [facility]    This document has been signed by Ace Oliveira MD on April 9, 2025 13:18 EDT

## 2025-04-09 NOTE — THERAPY EVALUATION
Respiratory Therapist Broncho-Pulmonary Hygiene Progress Note      Patient Name:  Luz Maria Burnett  YOB: 1950    Luz Maria Burnett meets the qualification for Level 1 of the Bronco-Pulmonary Hygiene Protocol. This was based on my daily patient assessment and includes review of chest x-ray results, cough ability and quality, oxygenation, secretions or risk for secretion development and patient mobility.     Broncho-Pulmonary Hygiene Assessment:    Level of Movement: Actively changing positions without assistance  Alert/ oriented/ cooperative    Breath Sounds: Clear to slightly diminished    Cough: Strong, effective    Chest X-Ray: Possible signs of consolidation and/or atelectasis or clear.     Sputum Productions: None or small amount of thin or watery secretions with effective cough    History and Physical: Chronic condition    SpO2 to Oxygen Need: greater than 92% on room air or  less than 3L nasal canula    Current SpO2 is: 96% on 2lpm NC    Based on this information, I have completed the following interventions: Teach/Instruct patient on cough and deep breathe      Electronically signed by Monserrat Qureshi RRT, 04/09/25, 9:05 AM EDT.

## 2025-04-09 NOTE — H&P
[facility] HISTORY AND PHYSICAL  Date: 2025   Patient Name: Luz Maria Burnett  : 1950  MRN: [id number]  Primary Care Physician:  Panfilo Ward MD  Date of admission: 2025    Subjective   Subjective     Chief Complaint: shortness of breath    HPI:    Luz Maria Burnett is a 74 y.o. female past medical history of recently diagnosed lung cancer, COPD, GERD, depression, psoriasis presented to the ED complaining of shortness of breath.  Patient reports that she was diagnosed about 2 weeks ago with lung adenocarcinoma and about 5 days ago she started experiencing worsening shortness of breath, she also reports a dry cough, also patient reports that she feels very thirsty and has not eaten anything for few days due to lack of appetite, she endorses unintentional weight loss.  Denies any chest pain, focal weakness, vision changes, nausea, vomiting, diarrhea, chills or fever, constipation.  In the ER patient noted to have a white blood cell count of 33, troponin 19, BNP over , chest x-ray concerning for necrotic mass and possible superimposed consolidation, patient received vancomycin and cefepime, also received fluids.  She will be admitted for further management and evaluation.        Personal History     Past Medical History:  Past Medical History:   Diagnosis Date    Ankle pain, right     Cancer     skin cancers removed, R lung    Colon polyp 2000    have had 4 or 5 colonoscopy    COPD (chronic obstructive pulmonary disease)     Depression     Foot pain, right     GERD (gastroesophageal reflux disease)     Irritable bowel syndrome     Psoriasis        Past Surgical History:  Past Surgical History:   Procedure Laterality Date    BRONCHOSCOPY N/A 3/25/2025    Procedure: BRONCHOSCOPY WITH ENDOBRONCHIAL ULTRASOUND: NEEDLE ASPIRATE, BAL, WASHINGS, BIOPSIES insertion of lighted instrument to view inside the lung and ultrasound examination with possible ultrasound-guided tissue  removal for testing;  Surgeon: Suman Best MD;  Location: [facility] MAIN OR;  Service: Pulmonary;  Laterality: N/A;    COLONOSCOPY      COLONOSCOPY N/A 2021    Procedure: COLONOSCOPY WITH BIOPSY;  Surgeon: Mil Kennedy MD;  Location: [facility] ENDOSCOPY;  Service: General;  Laterality: N/A;  NORMAL COLONOSCOPY    EYE SURGERY      cataracs    LAPAROSCOPIC TUBAL LIGATION      TUBAL ABDOMINAL LIGATION         Family History:   Family History   Problem Relation Age of Onset    Heart disease Father     Diabetes Father     Early death Father     Hearing loss Father     Hyperlipidemia Father     Arthritis Mother     Asthma Mother     Cancer Brother     Diabetes Brother         Type II    Cancer Brother     Diabetes Brother         Type II    Hearing loss Brother     Diabetes Brother         Type II    Malig Hyperthermia Neg Hx        Social History:   Social History     Socioeconomic History    Marital status:    Tobacco Use    Smoking status: Former     Current packs/day: 0.00     Average packs/day: 0.6 packs/day for 77.8 years (50.3 ttl pk-yrs)     Types: Cigarettes     Start date:      Quit date:      Years since quittin.2    Smokeless tobacco: Never    Tobacco comments:     Quit 4-5 weeks ago    Vaping Use    Vaping status: Never Used   Substance and Sexual Activity    Alcohol use: Not Currently     Comment: no current use    Drug use: Never    Sexual activity: Defer     Partners: Male     Birth control/protection: Post-menopausal, None       Home Medications:  FLUoxetine, folic acid, guaiFENesin, loperamide, methotrexate, omeprazole OTC, and rosuvastatin    Allergies:  No Known Allergies    Review of Systems   All systems were reviewed and negative except for: as indicated on HPI    Objective   Objective     Vitals:   Temp:  [98 °F (36.7 °C)] 98 °F (36.7 °C)  Heart Rate:  [] 93  Resp:  [16-19] 16  BP: ()/(48-55) 89/48  Flow (L/min) (Oxygen Therapy):  [2]  2    Physical Exam    Constitutional: Awake, alert, no acute distress   Eyes: Pupils equal, sclerae anicteric, no conjunctival injection   HENT: NCAT, mucous membranes moist   Neck: Supple, no thyromegaly, no lymphadenopathy, trachea midline   Respiratory: right sided rhonchi on ascultation, nonlabored respirations    Cardiovascular: RRR, no murmurs, rubs, or gallops, palpable pedal pulses bilaterally   Gastrointestinal: Positive bowel sounds, soft, nontender, nondistended   Musculoskeletal: No bilateral ankle edema, no clubbing or cyanosis to extremities   Psychiatric: Appropriate affect, cooperative   Neurologic: Oriented x 3, strength symmetric in all extremities, Cranial Nerves grossly intact to confrontation, speech clear   Skin: No rashes     Result Review    Result Review:  I have personally reviewed the results from the time of this admission to 4/9/2025 02:18 EDT and agree with these findings:  [x]  Laboratory  [x]  Microbiology  [x]  Radiology  [x]  EKG/Telemetry   [x]  Cardiology/Vascular   []  Pathology  []  Old records  []  Other:      Assessment & Plan   Assessment / Plan     Assessment/Plan:   Acute hypoxic respiratory failure  Lung adenocarcinoma  Concern for Pneumonia  Leukocytosis  Lactic acidosis  Elevated troponin likely, demand ischemia  Malnutrition  -Continue cefepime  -IVF  -Pulmonology consult  -follow blood culture  -Duonebs  -trend troponin  -Trend lactic acid until it resolves  -Monitor O2 sats  -Nutrition consult  -Obtain CTA chest     Chronic Conditions:  COPD  GERD  Depression  Psoriasis  -Resume home meds for chronic conditions as feasible      VTE Prophylaxis:  Pharmacologic VTE prophylaxis orders are present.        CODE STATUS:         Admission Status:  I believe this patient meets inpatient status.    Electronically signed by Maurice Shelley MD, 04/09/25, 2:18 AM EDT.

## 2025-04-09 NOTE — PROGRESS NOTES
"[facility] Clinical Pharmacy Services: Vancomycin Pharmacokinetic Initial Consult Note    Luz Maria Burnett is a 74 y.o. female who is on day 1 of pharmacy to dose vancomycin for Pneumonia.    Consult Information  Consulting Provider: Rolando Candelaria  Planned Duration of Therapy: 5-7d  Was Patient Receiving Prior to Admission/Consult?: No  Loading Dose Ordered or Given: 1000 mg on  at 2346  PK/PD Target: -600 mg/L.hr   Relevant ID History: none  Other Antimicrobials: Cefepime    Imaging Reviewed?: Yes; postobstructive pneumonia secondary to new dx lung cancer    Microbiology Data  MRSA PCR performed: 25; Result: Pending  Culture/Source:    Blood ordered    Vitals/Labs  Ht: 167.6 cm (65.98\"); Wt: 47.9 kg (105 lb 9.6 oz)  Temp (24hrs), Av °F (36.7 °C), Min:98 °F (36.7 °C), Max:98 °F (36.7 °C)   Estimated Creatinine Clearance: 40.1 mL/min (by C-G formula based on SCr of 0.93 mg/dL).       Results from last 7 days   Lab Units 25  0132 25  2137 25  1222   CREATININE mg/dL 0.93 1.03* 0.80   WBC 10*3/mm3 24.83* 33.07*  --      Assessment/Plan:    Vancomycin Dose:  1000 mg IV every 24 hours; which provides the following predicted parameters:  Clinical Pharmacist   Loading dose: N/A  Regimen: 1000 mg IV every 24 hours.  Start time: 23:46 on 2025  Exposure target: AUC24 (range)400-600 mg/L.hr   AUC24,ss: 517 mg/L.hr  Probability of AUC24 > 400: 79 %  Ctrough,ss: 15.3 mg/L  Probability of Ctrough,ss > 20: 25 %  Probability of nephrotoxicity (Lodise DEANNA ): 11 %  Vanc Trough planned for 4/10 at 2000  Patient has order for Basic Metabolic Panel    Pharmacy will follow patient's kidney function and will adjust doses and obtain levels as necessary. Thank you for involving pharmacy in this patient's care. Please contact pharmacy with any questions or concerns.                           Pam Mcarthur [facility]  "

## 2025-04-09 NOTE — PLAN OF CARE
Goal Outcome Evaluation:  Plan of Care Reviewed With: patient        Progress: improving  Outcome Evaluation: Patient alert and oriented x 4. Patient on 2L NC. Plans for radiation tomorrow. IV antibiotics administered per orders. Continue with plan of care.

## 2025-04-09 NOTE — ED PROVIDER NOTES
Time: 10:37 PM EDT  Date of encounter:  4/8/2025  Independent Historian/Clinical History and Information was obtained by:   Patient    History is limited by: N/A    Chief Complaint: shortness of breath      History of Present Illness:  Patient is a 74 y.o. year old female who presents to the emergency department for evaluation of Shortness of breath.  Patient also reports cough.  She said cough has improved since nebulizer treatment.  Patient was recently diagnosed with lung cancer.  She has not started treatment yet.  No fever.        Patient Care Team  Primary Care Provider: Panfilo Ward MD    Past Medical History:     No Known Allergies  Past Medical History:   Diagnosis Date    Ankle pain, right     Cancer     skin cancers removed, R lung    Colon polyp 5/1/2000    have had 4 or 5 colonoscopy    COPD (chronic obstructive pulmonary disease)     Depression 1996    Foot pain, right     GERD (gastroesophageal reflux disease)     Irritable bowel syndrome 1985    Psoriasis      Past Surgical History:   Procedure Laterality Date    BRONCHOSCOPY N/A 3/25/2025    Procedure: BRONCHOSCOPY WITH ENDOBRONCHIAL ULTRASOUND: NEEDLE ASPIRATE, BAL, WASHINGS, BIOPSIES insertion of lighted instrument to view inside the lung and ultrasound examination with possible ultrasound-guided tissue removal for testing;  Surgeon: Suman Best MD;  Location: [facility] MAIN OR;  Service: Pulmonary;  Laterality: N/A;    COLONOSCOPY      COLONOSCOPY N/A 12/09/2021    Procedure: COLONOSCOPY WITH BIOPSY;  Surgeon: Mil Kennedy MD;  Location: [facility] ENDOSCOPY;  Service: General;  Laterality: N/A;  NORMAL COLONOSCOPY    EYE SURGERY  2010    cataracs    LAPAROSCOPIC TUBAL LIGATION      TUBAL ABDOMINAL LIGATION  1986     Family History   Problem Relation Age of Onset    Heart disease Father     Diabetes Father     Early death Father     Hearing loss Father     Hyperlipidemia Father     Arthritis Mother     Asthma Mother     Cancer  Brother     Diabetes Brother         Type II    Cancer Brother     Diabetes Brother         Type II    Hearing loss Brother     Diabetes Brother         Type II    Malig Hyperthermia Neg Hx        Home Medications:  Prior to Admission medications    Medication Sig Start Date End Date Taking? Authorizing Provider   albuterol sulfate  (90 Base) MCG/ACT inhaler Inhale 2 puffs Every 4 (Four) Hours As Needed for Wheezing or Shortness of Air.  Patient not taking: Reported on 2025 3/18/25   Doreen Bingham APRN   FLUoxetine (PROzac) 20 MG capsule Take 1 capsule by mouth Daily.  Patient not taking: Reported on 2025   Panfilo Ward MD   folic acid (FOLVITE) 1 MG tablet Take 1 tablet by mouth Daily.  Patient not taking: Reported on 2025   Panfilo Ward MD   guaiFENesin (Mucinex) 600 MG 12 hr tablet Take 2 tablets by mouth 2 (Two) Times a Day. 25   Ruddy Rossi MD   loperamide (Imodium A-D) 2 MG tablet     Frankie Farr MD   methotrexate 2.5 MG tablet Take 4 tablets by mouth 1 (One) Time Per Week.  Patient not taking: Reported on 2025 1/1/10   Frankie Farr MD   omeprazole OTC (PrilOSEC OTC) 20 MG EC tablet Take 2 tablets by mouth Daily. 25   Ruddy Rossi MD   rosuvastatin (Crestor) 10 MG tablet Take 1 tablet by mouth Daily.  Patient not taking: Reported on 2025   Panfilo Ward MD        Social History:   Social History     Tobacco Use    Smoking status: Former     Current packs/day: 0.00     Average packs/day: 0.6 packs/day for 77.8 years (50.3 ttl pk-yrs)     Types: Cigarettes     Start date:      Quit date:      Years since quittin.2    Smokeless tobacco: Never    Tobacco comments:     Quit 4-5 weeks ago    Vaping Use    Vaping status: Never Used   Substance Use Topics    Alcohol use: Not Currently     Comment: no current use    Drug use: Never         Review of Systems:  Review of Systems  "  Constitutional:  Negative for chills and fever.   HENT:  Negative for congestion, ear pain and sore throat.    Eyes:  Negative for pain.   Respiratory:  Positive for cough and shortness of breath. Negative for chest tightness.    Cardiovascular:  Negative for chest pain.   Gastrointestinal:  Negative for abdominal pain, diarrhea, nausea and vomiting.   Genitourinary:  Negative for flank pain and hematuria.   Musculoskeletal:  Negative for joint swelling.   Skin:  Negative for pallor.   Neurological:  Negative for seizures and headaches.   All other systems reviewed and are negative.       Physical Exam:  BP 97/51 (BP Location: Right arm)   Pulse 120   Temp 98 °F (36.7 °C) (Oral)   Resp 16   Ht 167.6 cm (66\")   Wt 47.1 kg (103 lb 13.4 oz)   SpO2 95%   BMI 16.76 kg/m²     Physical Exam  Constitutional:       Appearance: Normal appearance.   HENT:      Head: Normocephalic and atraumatic.      Nose: Nose normal.      Mouth/Throat:      Mouth: Mucous membranes are moist.   Eyes:      Extraocular Movements: Extraocular movements intact.      Conjunctiva/sclera: Conjunctivae normal.      Pupils: Pupils are equal, round, and reactive to light.   Cardiovascular:      Rate and Rhythm: Normal rate and regular rhythm.      Pulses: Normal pulses.      Heart sounds: Normal heart sounds.   Pulmonary:      Effort: Pulmonary effort is normal.      Breath sounds: Rhonchi present.   Abdominal:      General: There is no distension.      Palpations: Abdomen is soft.      Tenderness: There is no abdominal tenderness.   Musculoskeletal:         General: Normal range of motion.      Cervical back: Normal range of motion.   Skin:     General: Skin is warm and dry.      Capillary Refill: Capillary refill takes less than 2 seconds.   Neurological:      General: No focal deficit present.      Mental Status: She is alert and oriented to person, place, and time. Mental status is at baseline.   Psychiatric:         Mood and Affect: Mood " normal.         Behavior: Behavior normal.                    Medical Decision Making:      Comorbidities that affect care:    Cancer, COPD    External Notes reviewed:    Previous Clinic Note: Patient was seen by pulmonology on 4/1/25 for adenocarcinoma of right lung, lung mass, multiple pulmonary nodules, pulmonary for Zima, dyspnea on exertion, weight loss, tobacco abuse      The following orders were placed and all results were independently analyzed by me:  Orders Placed This Encounter   Procedures    Blood Culture - Blood,    Blood Culture - Blood,    XR Chest 1 View    CT Angiogram Chest    [place] Draw    Comprehensive Metabolic Panel    BNP    High Sensitivity Troponin T    CBC Auto Differential    Scan Slide    Manual Differential    High Sensitivity Troponin T 1Hr    Lactic Acid, Plasma    [place] Draw    Arterial Blood Gas,H&H,Lytes,Lactate    Blood Gas, Arterial -    STAT Lactic Acid, Reflex    NPO Diet NPO Type: Strict NPO    Undress & Gown    Vital Signs    Undress & Gown    Continuous Pulse Oximetry    Vital Signs    Inpatient Hospitalist Consult    Oxygen Therapy- Nasal Cannula; Titrate 1-6 LPM Per SpO2; 90 - 95%    Oxygen Therapy- Nasal Cannula; Titrate 1-6 LPM Per SpO2; 90 - 95%    POC Glucose Once    POC Lactate    POC Electrolyte Panel    ECG 12 Lead ED Triage Standing Order; SOA    Insert Peripheral IV    Insert Peripheral IV    CBC & Differential    Green Top (Gel)    Lavender Top    Gold Top - SST    Light Blue Top    Green Top (Gel)    Lavender Top    Gold Top - SST    Light Blue Top       Medications Given in the Emergency Department:  Medications   sodium chloride 0.9 % flush 10 mL (has no administration in time range)   sodium chloride 0.9 % flush 10 mL (has no administration in time range)   vancomycin (VANCOCIN) 1,000 mg in sodium chloride 0.9 % 250 mL IVPB-VTB (has no administration in time range)   cefepime 2000 mg IVPB in 100 mL NS (VTB) (2,000 mg Intravenous New Bag 4/8/25 1076)    sodium chloride 0.9 % bolus 1,413 mL (1,000 mL Intravenous New Bag 4/8/25 2219)        ED Course:    ED Course as of 04/08/25 2257 Tue Apr 08, 2025 2139 ECG 12 Lead ED Triage Standing Order; SOA  Sinus tachycardia with a rate of 125.  No definite ST elevations.  Normal IL and QTc.  EKG interpreted by me. [LD]      ED Course User Index  [LD] Mitch Perea MD       Labs:    Lab Results (last 24 hours)       Procedure Component Value Units Date/Time    CBC & Differential [[id number]]  (Abnormal) Collected: 04/08/25 2137    Specimen: Blood Updated: 04/08/25 2203    Narrative:      The following orders were created for panel order CBC & Differential.  Procedure                               Abnormality         Status                     ---------                               -----------         ------                     CBC Auto Differential[[id number]]        Abnormal            Final result               Scan Slide[[id number]]                                       Final result                 Please view results for these tests on the individual orders.    Comprehensive Metabolic Panel [[id number]]  (Abnormal) Collected: 04/08/25 2137    Specimen: Blood Updated: 04/08/25 2206     Glucose 136 mg/dL      BUN 30 mg/dL      Creatinine 1.03 mg/dL      Sodium 134 mmol/L      Potassium 4.4 mmol/L      Chloride 98 mmol/L      CO2 21.7 mmol/L      Calcium 8.3 mg/dL      Total Protein 6.2 g/dL      Albumin 1.8 g/dL      ALT (SGPT) 9 U/L      AST (SGOT) 21 U/L      Alkaline Phosphatase 121 U/L      Total Bilirubin 1.2 mg/dL      Globulin 4.4 gm/dL      A/G Ratio 0.4 g/dL      BUN/Creatinine Ratio 29.1     Anion Gap 14.3 mmol/L      eGFR 57.2 mL/min/1.73     Narrative:      GFR Categories in Chronic Kidney Disease (CKD)      GFR Category          GFR (mL/min/1.73)    Interpretation  G1                     90 or greater         Normal or high (1)  G2                      60-89                Mild decrease (1)  G3a                    45-59                Mild to moderate decrease  G3b                   30-44                Moderate to severe decrease  G4                    15-29                Severe decrease  G5                    14 or less           Kidney failure          (1)In the absence of evidence of kidney disease, neither GFR category G1 or G2 fulfill the criteria for CKD.    eGFR calculation 2021 CKD-EPI creatinine equation, which does not include race as a factor    BNP [[id number]]  (Abnormal) Collected: 04/08/25 2137    Specimen: Blood Updated: 04/08/25 2203     proBNP 2,135.0 pg/mL     Narrative:      This assay is used as an aid in the diagnosis of individuals suspected of having heart failure. It can be used as an aid in the diagnosis of acute decompensated heart failure (ADHF) in patients presenting with signs and symptoms of ADHF to the emergency department (ED). In addition, NT-proBNP of <300 pg/mL indicates ADHF is not likely.    Age Range Result Interpretation  NT-proBNP Concentration (pg/mL:      <50             Positive            >450                   Gray                 300-450                    Negative             <300    50-75           Positive            >900                  Gray                300-900                  Negative            <300      >75             Positive            >1800                  Gray                300-1800                  Negative            <300    High Sensitivity Troponin T [[id number]]  (Abnormal) Collected: 04/08/25 2137    Specimen: Blood Updated: 04/08/25 2206     HS Troponin T 19 ng/L     Narrative:      High Sensitive Troponin T Reference Range:  <14.0 ng/L- Negative Female for AMI  <22.0 ng/L- Negative Male for AMI  >=14 - Abnormal Female indicating possible myocardial injury.  >=22 - Abnormal Male indicating possible myocardial injury.   Clinicians would have to utilize clinical acumen, EKG, Troponin, and serial changes to determine if it is an Acute Myocardial  Infarction or myocardial injury due to an underlying chronic condition.         CBC Auto Differential [[id number]]  (Abnormal) Collected: 04/08/25 2137    Specimen: Blood Updated: 04/08/25 2203     WBC 33.07 10*3/mm3      RBC 4.29 10*6/mm3      Hemoglobin 11.9 g/dL      Hematocrit 38.1 %      MCV 88.8 fL      MCH 27.7 pg      MCHC 31.2 g/dL      RDW 15.9 %      RDW-SD 50.4 fl      MPV 9.9 fL      Platelets 213 10*3/mm3     Scan Slide [[id number]] Collected: 04/08/25 2137    Specimen: Blood Updated: 04/08/25 2203     Scan Slide --     Comment: See Manual Differential Results       Manual Differential [[id number]]  (Abnormal) Collected: 04/08/25 2137    Specimen: Blood Updated: 04/08/25 2203     Neutrophil % 78.0 %      Lymphocyte % 7.0 %      Monocyte % 2.0 %      Eosinophil % 1.0 %      Bands %  12.0 %      Neutrophils Absolute 29.76 10*3/mm3      Lymphocytes Absolute 2.31 10*3/mm3      Monocytes Absolute 0.66 10*3/mm3      Eosinophils Absolute 0.33 10*3/mm3      Crenated RBC's Slight/1+     Polychromasia Slight/1+     Vacuolated Neutrophils Slight/1+     Platelet Estimate Adequate    Lactic Acid, Plasma [[id number]] Collected: 04/08/25 2230    Specimen: Blood Updated: 04/08/25 2236    Blood Culture - Blood, Blood, Venous [[id number]] Collected: 04/08/25 2230    Specimen: Blood, Venous Updated: 04/08/25 2236    Blood Culture - Blood, Blood, Venous [[id number]] Collected: 04/08/25 2230    Specimen: Blood, Venous Updated: 04/08/25 2237    POC Glucose Once [[id number]]  (Abnormal) Collected: 04/08/25 2238    Specimen: Arterial Blood Updated: 04/08/25 2241     Glucose 137 mg/dL      Comment: Serial Number: [id number]:  [id number]       Blood Gas, Arterial - [[id number]]  (Abnormal) Collected: 04/08/25 2238    Specimen: Arterial Blood Updated: 04/08/25 2241     Site Left Brachial     Ric's Test Positive     pH, Arterial 7.429 pH units      pCO2, Arterial 29.7 mm Hg      pO2, Arterial 80.2 mm Hg      HCO3, Arterial 19.7 mmol/L       Base Excess, Arterial -3.8 mmol/L      Comment: Serial Number: [id number]:  [id number]        O2 Saturation, Arterial 96.3 %      Hemoglobin, Blood Gas 10.4 g/dL      Hematocrit, Blood Gas 31.0 %      Barometric Pressure for Blood Gas 748.7000 mmHg      Modality Cannula     FIO2 28 %      Flow Rate 2.0000 lpm      Hemodilution No     PO2/FIO2 286    POC Lactate [[id number]]  (Abnormal) Collected: 04/08/25 2238    Specimen: Arterial Blood Updated: 04/08/25 2241     Lactate 3.1 mmol/L      Comment: Serial Number: [id number]:  [id number]       POC Electrolyte Panel [[id number]]  (Abnormal) Collected: 04/08/25 2238    Specimen: Arterial Blood Updated: 04/08/25 2241     Sodium 137 mmol/L      POC Potassium 3.2 mmol/L      Chloride 103 mmol/L      Ionized Calcium 1.07 mmol/L      Comment: Serial Number: [id number]:  [id number]                Imaging:    XR Chest 1 View  Result Date: 4/8/2025  XR CHEST 1 VW Date of Exam: 4/8/2025 9:47 PM EDT Indication: SOA Triage Protocol Comparison: PET/CT 3/26/2025 Findings: Mediastinum: Cardiac silhouette appears unchanged and normal in size. Lungs: Emphysema. Consolidation of the right mid to lower lung. Left lung pulmonary nodules better seen on prior PET/CT. Pleura: Possible small right pleural effusion. No visible left pleural effusion or pneumothorax. Bones and soft tissues: No acute, displaced fracture seen.     Impression: Consolidation of the right mid to lower lung, the majority of which likely represents known necrotic malignancy. Superimposed infection is not excluded. Left lung pulmonary nodules better seen on prior PET/CT. Electronically Signed: Rehan Harp  4/8/2025 10:08 PM EDT  Workstation ID: [id number]        Differential Diagnosis and Discussion:    Cough: Differential diagnosis includes but is not limited to pneumonia, acute bronchitis, upper respiratory infection, ACE inhibitor use, allergic reaction, epiglottitis, seasonal allergies, chemical irritants,  exercise-induced asthma, viral syndrome.  Dyspnea: Differential diagnosis includes but is not limited to metabolic acidosis, neurological disorders, psychogenic, asthma, pneumothorax, upper airway obstruction, COPD, pneumonia, noncardiogenic pulmonary edema, interstitial lung disease, anemia, congestive heart failure, and pulmonary embolism    PROCEDURES:    Labs were collected in the emergency department and all labs were reviewed and interpreted by me.  X-ray were performed in the emergency department and all X-ray impressions were independently interpreted by me.  An EKG was performed and the EKG was interpreted by me.    ECG 12 Lead ED Triage Standing Order; SOA   Preliminary Result   HEART KTMZ=393  bpm   RR Pxpxljzo=491  ms   IL Rdlkuryw=500  ms   P Horizontal Axis=36  deg   P Front Axis=61  deg   QRSD Interval=82  ms   QT Tujsyclz=445  ms   BLdV=170  ms   QRS Axis=39  deg   T Wave Axis=43  deg   - ABNORMAL ECG -   Sinus tachycardia   Atrial premature complexes   Date and Time of Study:2025-04-08 21:26:30          Procedures    MDM  Number of Diagnoses or Management Options  Pneumonia due to infectious organism, unspecified laterality, unspecified part of lung  Sepsis, due to unspecified organism, unspecified whether acute organ dysfunction present  Diagnosis management comments: Patient presented to the emergency department with shortness of breath.  Chest x-ray showed consolidation of right mid to lower lung with possible superimposed infection.  Patient has an elevated white count at 33.  Patient was started on broad-spectrum antibiotics.  Discussed patient with hospitalist and will admit for further care.       Amount and/or Complexity of Data Reviewed  Clinical lab tests: reviewed  Tests in the radiology section of CPT®: reviewed  Review and summarize past medical records: yes  Independent visualization of images, tracings, or specimens: yes    Risk of Complications, Morbidity, and/or  Mortality  Presenting problems: moderate  Management options: moderate             Total Critical Care time of 40 minutes. Total critical care time documented does not include time spent on separately billed procedures for services of nurses or physician assistants. I personally saw and examined the patient. I have reviewed all diagnostic interpretations and treatment plans as written. I was present for the key portions of any procedures performed and the inclusive time noted in any critical care statement. Critical care time includes patient management by me, time spent at the patients bedside,  time to review lab and imaging results, discussing patient care, documentation in the medical record, and time spent with family or caregiver.      Sepsis criteria was met in the emergency department and the Sepsis protocol (including antibiotic administration) was initiated.      SIRS criteria considered:   1.  Temperature > 100.4 or <96.8    2.  Heart Rate > 90    3.  Respiratory Rate > 22    4.  WBC > 12K or <4K.             Severe Sepsis:     Respiratory: Mechanical Ventilation or Bipap  Hypotension: SBP > 90 or MAP < 65  Renal: Creatinine > 2  Metabolic: Lactic Acid > 2  Hematologic: Platelets < 100K or INR > 1.5  Hepatic: BILI  >  2  CNS: Sudden AMS     Septic Shock:     Severe Sepsis + Persistent hypotension or Lactic Acid > 4     Normal saline bolus, Antibiotics, and final disposition was based on these definitions.        Sepsis was recognized at 10:10pm    Antibiotics were ordered.     30 mL/kg bolus was indicated.       Patient Care Considerations:          Consultants/Shared Management Plan:    Hospitalist: I have discussed the case with Dr Shelley who agrees to accept the patient for admission.    Social Determinants of Health:    Patient has presented with family members who are responsible, reliable and will ensure follow up care.      Disposition and Care Coordination:    Admit:   Through independent  evaluation of the patient's history, physical, and imperical data, the patient meets criteria for inpatient admission to the hospital.        Final diagnoses:   Pneumonia due to infectious organism, unspecified laterality, unspecified part of lung   Sepsis, due to unspecified organism, unspecified whether acute organ dysfunction present        ED Disposition       ED Disposition   Decision to Admit    Condition   --    Comment   --               This medical record created using voice recognition software.             Mitch Perea MD  04/08/25 [id number]

## 2025-04-09 NOTE — CONSULTS
"Nutrition Services    Patient Name: Luz Maria Burnett  YOB: 1950  MRN: [id number]  Admission date: 4/8/2025      CLINICAL NUTRITION ASSESSMENT      Reason for Assessment  Unintentional Weight Loss and BMI     H&P:  Past Medical History:   Diagnosis Date    Ankle pain, right     Cancer     skin cancers removed, R lung    Colon polyp 5/1/2000    have had 4 or 5 colonoscopy    COPD (chronic obstructive pulmonary disease)     Depression 1996    Foot pain, right     GERD (gastroesophageal reflux disease)     Irritable bowel syndrome 1985    Psoriasis         Current Problems:   Acute Hypoxic Respiratory failure   Concern for PNA   COPD     Lung adenocarcinoma     Malnutrition      Nutrition/Diet History         Narrative   4/9: Visited patient this afternoon to obtain nutritional hx. PTA patient reports a poor appetite about 1 month in duration along with a ~40 lb weight loss. This is fairly consistent with EMR data showing a 30 lb (22%) weight loss over the past x 2 months which is significant. Patient reports PTA only eating 1-2 bites per meal. Since seeking medical tx, she states her appetite is slowly coming back. Patient has a son at home who provides support with grocery shopping/meal preparation. Denies issues chewing/swallowing. Discussed adding ONS to promote calorie/protein intake. Provided patient with high calore/protein diet education. NFPE completed, consistent with nutrition diagnosis of malnutrition using AND/ASPEN criteria. See MSA below.      Anthropometrics        Current Height, Weight Height: 167.6 cm (65.98\")  Weight: 47.9 kg (105 lb 9.6 oz)   Current BMI Body mass index is 17.05 kg/m².   BMI Classification Underweight   % IBW    Adjusted Body Weight (ABW)    Weight Hx  Wt Readings from Last 30 Encounters:   04/09/25 0345 47.9 kg (105 lb 9.6 oz)   04/08/25 2141 47.1 kg (103 lb 13.4 oz)   04/04/25 1318 47.1 kg (103 lb 13.4 oz)   04/01/25 1352 46.3 kg (102 lb)   03/25/25 1343 46.7 kg (102 lb " "15.3 oz)   03/18/25 1301 45.6 kg (100 lb 9.6 oz)   02/26/25 1346 61.2 kg (135 lb)   06/27/24 1046 64.9 kg (143 lb)   05/01/24 1437 64.9 kg (143 lb)   04/06/23 1150 64.4 kg (142 lb)   04/04/22 1620 61.2 kg (135 lb)   12/09/21 0756 62.2 kg (137 lb 2 oz)   12/08/21 0946 63.5 kg (140 lb)   10/12/21 1326 66 kg (145 lb 9.6 oz)   03/23/21 0000 66.2 kg (146 lb)   09/25/19 0000 64.9 kg (143 lb)   08/15/19 0000 65.3 kg (144 lb)   03/27/19 0000 65.4 kg (144 lb 2 oz)   09/20/18 0000 65.8 kg (145 lb)          Wt Change Observation 4/9: Down 22% x 2 months-significant      Labs/Medications         Pertinent Labs Reviewed.   Results from last 7 days   Lab Units 04/09/25  0132 04/08/25  2137 04/02/25  1222   SODIUM mmol/L 131* 134*  --    POTASSIUM mmol/L 4.0 4.4  --    CHLORIDE mmol/L 99 98  --    CO2 mmol/L 19.1* 21.7*  --    BUN mg/dL 29* 30*  --    CREATININE mg/dL 0.93 1.03* 0.80   CALCIUM mg/dL 7.5* 8.3*  --    BILIRUBIN mg/dL  --  1.2  --    ALK PHOS U/L  --  121*  --    ALT (SGPT) U/L  --  9  --    AST (SGOT) U/L  --  21  --    GLUCOSE mg/dL 202* 136*  --      Results from last 7 days   Lab Units 04/09/25  0132   HEMOGLOBIN g/dL 10.5*   HEMATOCRIT % 33.9*     No results found for: \"COVID19\"  No results found for: \"HGBA1C\"      Pertinent Medications Reviewed.     Malnutrition Severity Assessment      Malnutrition Severity Assessment      Patient meets criteria for : Severe Malnutrition  Malnutrition Type (Last 8 Hours)       Malnutrition Severity Assessment       Row Name 04/09/25 1359       Malnutrition Severity Assessment    Malnutrition Type Chronic Disease - Related Malnutrition      Row Name 04/09/25 1359       Insufficient Energy Intake     Insufficient Energy Intake Findings Severe    Insufficient Energy Intake  <75% of est. energy requirement for > or equal to 1 month      Row Name 04/09/25 1359       Unintentional Weight Loss     Unintentional Weight Loss Findings Severe    Unintentional Weight Loss  Weight loss " greater than 7.5% in three months      Row Name 04/09/25 135       Muscle Loss    Loss of Muscle Mass Findings Severe    [place] Severe - deep hollowing/scooping, lack of muscle to touch, facial bones well defined    Clavicle Bone Region Severe - protruding prominent bone    Acromion Bone Region Severe - squared shoulders, bones, and acromion process protrusion prominent    Scapular Bone Region Severe - prominent bones, depressions easily visible between ribs, scapula, spine, shoulders    Dorsal Hand Region Severe - prominent depression    Patellar Region Severe - prominent bone, square looking, very little muscle definition    Anterior Thigh Region Severe - line/depression along thigh, obviously thin    Posterior Calf Region Severe - thin with very little definition/firmness      Row Name 04/09/25 135       Fat Loss    Subcutaneous Fat Loss Findings Severe    Orbital Region  Severe - pronounced hollowness/depression, dark circles, loose saggy skin    Upper Arm Region Severe - mostly skin, very little space between folds, fingers touch    Thoracic & Lumbar Region Severe - ribs visible with prominent depressions, iliac crest very prominent      Row Name 04/09/25 6686       Criteria Met (Must meet criteria for severity in at least 2 of these categories: M Wasting, Fat Loss, Fluid, Secondary Signs, Wt. Status, Intake)    Patient meets criteria for  Severe Malnutrition                            Nutrition Diagnosis         Nutrition Dx Problem 1 Severe chronic disease related malnutrition r/t decreased energy intakes in the context of disease states (COPD, lung cancer) AEB significant weight loss of 22% x 2 months, < 75% of estimated energy requirements > 1 month, and muscle/fat loss as noted on physical exam.      Nutrition Intervention           Current Nutrition Orders & Evaluation of Intake       Current PO Diet Diet: Cardiac; Healthy Heart (2-3 Na+); Fluid Consistency: Thin (IDDSI 0)   Supplement No active  supplement orders      100% x 1 meal      Nutrition Intervention/Prescription        Boost Original BID (Provides 480 kcals, 20 g protein if consumed)     Provided High Calorie/Protein diet education     Liberalize Diet to Regular with thin liquids         Medical Nutrition Therapy/Nutrition Education          Learner     Readiness Patient and Family  Acceptance     Method     Response Explanation and Teachback  Verbalizes understanding     Monitor/Evaluation        Monitor I&O, PO intake, Supplement intake, Pertinent labs, Weight, Skin status, GI status, Symptoms     Nutrition Discharge Plan         No nutrition discharge needs identified at this time       Electronically signed by:  Radha Auguste RD  04/09/25 10:48 EDT

## 2025-04-10 NOTE — DISCHARGE SUMMARY
[facility]         HOSPITALIST  DISCHARGE SUMMARY    Patient Name: Luz Maria Burnett    : 1950    MRN: [id number]    Date of Admission: 2025  Date of Discharge:  04/10/25  Primary Care Physician: Panfilo Ward MD    Consults       Date and Time Order Name Status Description    2025 12:10 PM Hematology & Oncology Inpatient Consult Completed     2025  8:15 AM Inpatient Pulmonology Consult Completed     2025  1:46 AM Inpatient Pulmonology Consult      2025 10:43 PM Inpatient Hospitalist Consult              Final Diagnosis:  Comfort Focused Measures Only  Sepsis secondary to pneumonia  Possible postobstructive pneumonia on the right  Necrotic mass approximately 11 cm in inferior right thorax with suspected necrotic adenopathy versus tumor extension into the right pulmonary hilum and mediastinum  Lactic acidosis  Metastatic lung adenocarcinoma diagnosed approximately 1 week ago and pleural-based metastatic disease  Concern for lymphangitic spread of tumor   Tumor thrombus within the distal right interlobular pulmonary artery versus (less likely) extrinsic deformity from tumor  COPD with severe emphysematous changes  Psoriasis on methotrexate outpatient  Metabolic acidosis  Chronic diarrhea  GERD  Dyslipidemia  Malnutrition/COPD and cancer related cachexia with a BMI of 17       Hospital Course     Hospital Course:  74-year-old female with COPD with severe emphysema, recently diagnosed with metastatic lung cancer have been pending outpatient immunotherapy but before able to starting treatment was admitted due to worsening respiratory distress and postobstructive pneumonia in setting of obstructive lesion from her malignancy.  Initial evaluation with pulmonology and plan for urgent inpatient radiation consideration of chemotherapy given her advanced disease bed after further goals of care discussion opting for comfort focused measures only and going home with hospice.   Medications were arranged.  Allergies listed.    Discharging home in stable but ultimately terminally ill condition from her advanced metastatic disease.  Will send her home with a course of oral antibiotics for postobstructive pneumonia as well in the meantime.  Discussed.  Medications and additional treatments with patient.      DISCHARGE Follow Up Recommendations for labs and diagnostics:   F/u with hospice    CODE STATUS:  Code Status and Medical Interventions: No CPR (Do Not Attempt to Resuscitate); Comfort Measures   Ordered at: 04/10/25 1320     Code Status (Patient has no pulse and is not breathing):    No CPR (Do Not Attempt to Resuscitate)     Medical Interventions (Patient has pulse or is breathing):    Comfort Measures           Day of Discharge     Vital Signs:  Temp:  [97.6 °F (36.4 °C)-98.4 °F (36.9 °C)] 97.6 °F (36.4 °C)  Heart Rate:  [] 104  Resp:  [18] 18  BP: (102-136)/(51-68) 102/56  Flow (L/min) (Oxygen Therapy):  [1-2] 1    Physical Exam    Gen: awake, resting in bed, conversant, tired appears older than stated age, thin, frail  Resp: Poor aeration some pursed lip breathing intermittently on nasal cannula       Discharge Details        Discharge Medications        New Medications        Instructions Start Date   amoxicillin-clavulanate 400-57 MG/5ML suspension  Commonly known as: AUGMENTIN   800 mg, Oral, Every 12 Hours Scheduled      atropine 1 % ophthalmic solution   2 drops, Ophthalmic, Every 30 Minutes PRN      Biotene dry mouth liquid liquid   15 mL, Oral, As Needed      Hydrocod Steve-Chlorphe Steve ER 10-8 MG/5ML ER suspension  Commonly known as: TUSSIONEX PENNKINETIC   2.5 mL, Oral, 3 Times Daily PRN      LORazepam 2 MG/ML concentrated solution  Commonly known as: ATIVAN   0.5 mg, Oral, Every 1 Hour PRN      morphine 20 MG/ML concentrated solution 20mg/ml   5 mg, Oral, Every 1 Hour PRN      ondansetron ODT 4 MG disintegrating tablet  Commonly known as: ZOFRAN-ODT   4 mg,  Translingual, Every 8 Hours PRN             Changes to Medications        Instructions Start Date   guaiFENesin 600 MG 12 hr tablet  Commonly known as: Mucinex  What changed:   when to take this  reasons to take this   1,200 mg, Oral, 2 Times Daily PRN             Continue These Medications        Instructions Start Date   Imodium A-D 2 MG tablet  Generic drug: loperamide   Take 1 tablet by mouth 3 (Three) Times a Day As Needed for Diarrhea.      omeprazole OTC 20 MG EC tablet  Commonly known as: PrilOSEC OTC   40 mg, Oral, Daily             Stop These Medications      FLUoxetine 20 MG capsule  Commonly known as: PROzac     folic acid 1 MG tablet  Commonly known as: FOLVITE     methotrexate 2.5 MG tablet     rosuvastatin 10 MG tablet  Commonly known as: Crestor                Discharge Disposition:  Home or Self Care    Diet: continue on diet / dietary restrictions from hospitalization   Dietary Orders (From admission, onward)       Start     Ordered    04/09/25 1800  Dietary Nutrition Supplements Boost Plus (Ensure Plus); chocolate  Daily With Breakfast & Dinner      Question Answer Comment   Select Supplement: Boost Plus (Ensure Plus)    Flavor: chocolate        04/09/25 1403    04/09/25 1404  Diet: Regular/House; Fluid Consistency: Thin (IDDSI 0)  Diet Effective Now        References:    Diet Order Definitions   Question Answer Comment   Diets: Regular/House    Fluid Consistency: Thin (IDDSI 0)        04/09/25 1403                    Discharge Activity: advance as tolerated          Pertinent  and/or Most Recent Results       LAB RESULTS:      Lab 04/10/25  0501 04/09/25  0733 04/09/25  0431 04/09/25  0132 04/08/25  2238 04/08/25  2230 04/08/25  2137   WBC 22.12*  --   --  24.83*  --   --  33.07*   HEMOGLOBIN 10.8*  --   --  10.5*  --   --  11.9*   HEMATOCRIT 34.2  --   --  33.9*  --   --  38.1   PLATELETS 168  --   --  161  --   --  213   NEUTROS ABS 19.48*  --   --  22.19*  --   --  29.76*   IMMATURE GRANS  (ABS) 0.44*  --   --  0.60*  --   --   --    LYMPHS ABS 0.89  --   --  0.76  --   --   --    MONOS ABS 1.13*  --   --  1.05*  --   --   --    EOS ABS 0.01  --   --  0.05  --   --  0.33   MCV 88.8  --   --  89.4  --   --  88.8   PROCALCITONIN  --   --  1.07*  --   --   --   --    LACTATE  --  2.0 2.2* 3.6* 3.1* 4.6*  --          Lab 04/10/25  0501 04/09/25  0132 04/08/25  2137   SODIUM 138 131* 134*   POTASSIUM 3.5 4.0 4.4   CHLORIDE 106 99 98   CO2 16.0* 19.1* 21.7*   ANION GAP 16.0* 12.9 14.3   BUN 40* 29* 30*   CREATININE 0.90 0.93 1.03*   EGFR 67.2 64.6 57.2*   GLUCOSE 190* 202* 136*   CALCIUM 8.3* 7.5* 8.3*   MAGNESIUM 2.1  --   --    PHOSPHORUS 3.8  --   --          Lab 04/08/25  2137   TOTAL PROTEIN 6.2   ALBUMIN 1.8*   GLOBULIN 4.4   ALT (SGPT) 9   AST (SGOT) 21   BILIRUBIN 1.2   ALK PHOS 121*         Lab 04/09/25  0132 04/08/25  2137   PROBNP  --  2,135.0*   HSTROP T 13 19*                 Lab 04/08/25  2238   PH, ARTERIAL 7.429   PCO2, ARTERIAL 29.7*   PO2 ART 80.2   O2 SATURATION ART 96.3   FIO2 28   HCO3 ART 19.7*   BASE EXCESS ART -3.8*     Brief Urine Lab Results       None          Microbiology Results (last 10 days)       Procedure Component Value - Date/Time    MRSA Screen, PCR (Inpatient) - Swab, Nares [[id number]]  (Normal) Collected: 04/10/25 1026    Lab Status: Final result Specimen: Swab from Nares Updated: 04/10/25 1142     MRSA PCR No MRSA Detected    Narrative:      The negative predictive value of this diagnostic test is high and should only be used to consider de-escalating anti-MRSA therapy. A positive result may indicate colonization with MRSA and must be correlated clinically.    Blood Culture - Blood, Blood, Venous [[id number]]  (Normal) Collected: 04/08/25 2230    Lab Status: Preliminary result Specimen: Blood, Venous Updated: 04/09/25 2246     Blood Culture No growth at 24 hours    Blood Culture - Blood, Blood, Venous [[id number]]  (Normal) Collected: 04/08/25 2230    Lab Status:  Preliminary result Specimen: Blood, Venous Updated: 04/09/25 2246     Blood Culture No growth at 24 hours            RADIOLOGY:    CT Angiogram Chest  Result Date: 4/9/2025  Impression: 1.  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 2.  No definite acute pulmonary embolism is seen. 3.  Again, there is a very large (11 cm) necrotic mass centered in the inferior right thorax, predominantly involving the lower lobe of the right lung. There is associated pleural-based metastatic disease. 4.  Metastatic left-sided pulmonary nodules are also suggested. 5.  No thoracic aortic aneurysm or dissection. 6.  Postobstructive pneumonia on the right may be present. Lymphangitic spread of tumor is possible. 7.  Severe emphysematous changes involve the lungs. 8.  Additional, findings are as described in prior recent exam reports. 9.  Please see above comments for further detail.   Portions of this note were completed with a voice recognition program.  4/9/2025 1:25 AM by Seun Antonio MD on Workstation: [id number]      XR Chest 1 View  Result Date: 4/8/2025  Impression: Impression: Consolidation of the right mid to lower lung, the majority of which likely represents known necrotic malignancy. Superimposed infection is not excluded. Left lung pulmonary nodules better seen on prior PET/CT. Electronically Signed: Rehan Harp  4/8/2025 10:08 PM EDT  Workstation ID: [id number]    MRI Brain With & Without Contrast  Result Date: 4/2/2025  Impression: Impression: 1.No acute ischemic change. 2.No abnormal postcontrast enhancement to suggest metastatic disease. 3.Moderate white matter changes which are nonspecific but most compatible small vessel ischemic disease in this age group. 4.Minimal paranasal sinus disease. Electronically Signed: Marino Miller MD  4/2/2025 1:01 PM EDT  Workstation ID: [id number]    NM PET/CT Skull Base to Mid Thigh  Result Date: 3/29/2025  Impression:  Impression: 1. 10 cm hypermetabolic necrotic mass in the right lower lobe with hypermetabolic right pleural metastatic disease and hypermetabolic mediastinal and right hilar adenopathy. This is consistent with malignancy. 2. 2 hypermetabolic nodules in the left lung measuring 8 mm and 1 cm. They may represent metastatic lesions or other primary malignancies. 3. Minimal right pleural effusion. Moderate abdominal/pelvic free fluid. 4. 9 mm hypermetabolic focus in the ascending colon. Suggest endoscopy to evaluate for a polyp/mass. Electronically Signed: Champ Pandya MD  3/29/2025 3:59 PM EDT  Workstation ID: [id number]    CT Chest Low Dose Cancer Screening WO  Result Date: 3/16/2025  Impression: Impression: Low-dose screening CT scan of the chest demonstrating marked emphysema. Ill-defined 10 cm mass is seen in the right lower lobe with central necrosis or cavitation. Mediastinal adenopathy. This may represent bronchogenic malignancy or possibly granulomatous disease. 1 cm cavitary lesion is seen in the left upper lobe on series 204 image 77. 1 cm solid lesion is seen in the left lower lobe on image 117. Recommendation: 4X Lung Rads Assessment: Lung-RADS L4XS - Very suspicious, >15% chance of malignancy; there is a clinically significant or potentially significant non-lung cancer finding. Electronically Signed: Bernardino Smith MD  3/16/2025 2:18 PM EDT  Workstation ID: [id number]      Labs Pending at Discharge:  Pending Labs       Order Current Status    Blood Culture - Blood, Blood, Venous Preliminary result    Blood Culture - Blood, Blood, Venous Preliminary result              Time spent on Discharge including face to face service:  >45 minutes

## 2025-04-10 NOTE — CASE MANAGEMENT/SOCIAL WORK
IP:  25 (Met IP criteria)  DC:  4/10/25  LOS:  < 2MN - [id number]  Exception:           First Provider Eval of Patient:   25 @ [id number]

## 2025-04-10 NOTE — THERAPY EVALUATION
Patient Name: Luz Maria Burnett  : 1950    MRN: [id number]                              Today's Date: 4/10/2025       Admit Date: 2025    Visit Dx:     ICD-10-CM ICD-9-CM   1. Pneumonia due to infectious organism, unspecified laterality, unspecified part of lung  J18.9 486   2. Sepsis, due to unspecified organism, unspecified whether acute organ dysfunction present  A41.9 038.9     995.91   3. Malignant neoplasm of lower lobe, right bronchus or lung  C34.31 162.5   4. Decreased activities of daily living (ADL)  Z78.9 V49.89     Patient Active Problem List   Diagnosis    Change in bowel habits    Diarrhea    Family history of colon cancer    Anxiety and depression    Colon polyps    Encounter for long-term (current) use of other medications    Lung cancer screening declined by patient    Nicotine dependence    Psoriasis    Anxiety and depression    Colon polyps    Lung cancer screening declined by patient    Nicotine dependence    Psoriasis    Lung mass    Malignant neoplasm of lower lobe of right lung    Acute hypoxic respiratory failure    Severe protein-calorie malnutrition     Past Medical History:   Diagnosis Date    Ankle pain, right     Cancer     skin cancers removed, R lung    Colon polyp 2000    have had 4 or 5 colonoscopy    COPD (chronic obstructive pulmonary disease)     Depression     Foot pain, right     GERD (gastroesophageal reflux disease)     Irritable bowel syndrome     Psoriasis      Past Surgical History:   Procedure Laterality Date    BRONCHOSCOPY N/A 3/25/2025    Procedure: BRONCHOSCOPY WITH ENDOBRONCHIAL ULTRASOUND: NEEDLE ASPIRATE, BAL, WASHINGS, BIOPSIES insertion of lighted instrument to view inside the lung and ultrasound examination with possible ultrasound-guided tissue removal for testing;  Surgeon: Suman Best MD;  Location: [facility];  Service: Pulmonary;  Laterality: N/A;    COLONOSCOPY      COLONOSCOPY N/A 2021    Procedure: COLONOSCOPY WITH  BIOPSY;  Surgeon: Mil Kennedy MD;  Location: [facility] ENDOSCOPY;  Service: General;  Laterality: N/A;  NORMAL COLONOSCOPY    EYE SURGERY  2010    cataracs    LAPAROSCOPIC TUBAL LIGATION      TUBAL ABDOMINAL LIGATION  1986      General Information       Row Name 04/10/25 1127          OT Time and Intention    Document Type evaluation  -AV     Mode of Treatment individual therapy;occupational therapy  -AV       Row Name 04/10/25 1127          General Information    Patient Profile Reviewed yes  -AV     Prior Level of Function ADL's;transfer;independent:  sits to bathe (tub without DME). stands at sink to groom. standard commode. ambulates without assistive device. no home O2.  -AV     Existing Precautions/Restrictions fall;oxygen therapy device and L/min  DNR  -AV     Barriers to Rehab none identified  -AV       Row Name 04/10/25 1127          Occupational Profile    Reason for Services/Referral (Occupational Profile) Patient is a 74 year old female admitted to [facility] on 4/8/25 with shortness of air. OT consulted due to recent decline in ADL/ transfer independence. No previous OT services for current condition.  -AV       Row Name 04/10/25 1127          Living Environment    People in Home spouse  -AV       Row Name 04/10/25 1127          Home Main Entrance    Number of Stairs, Main Entrance three  -AV       Row Name 04/10/25 1127          Stairs Within Home, Primary    Stairs, Within Home, Primary non-essential interior stairs  -AV       Row Name 04/10/25 1127          Cognition    Orientation Status (Cognition) --  alert, pleasant and cooperative. able to retain information and follow commands.  -AV       Row Name 04/10/25 1127          Safety Issues/Impairments Affecting Functional Mobility    Impairments Affecting Function (Mobility) balance;endurance/activity tolerance;strength  -AV               User Key  (r) = Recorded By, (t) = Taken By, (c) = Cosigned By      Initials Name Provider  "Type    Rizwan Nathan OT Occupational Therapist                     Mobility/ADL's       Row Name 04/10/25 1129          Transfers    Comment, (Transfers) pleasantly declined testing (\"bad night\" & SOA): CGA/ RW per report  -AV       Row Name 04/10/25 1129          Activities of Daily Living    BADL Assessment/Intervention --  Independent feeding and grooming with setup while seated. Min assist bathing and dressing. CGA toilet hygiene. rest breaks required during ADLs due to shortness of air.  -AV               User Key  (r) = Recorded By, (t) = Taken By, (c) = Cosigned By      Initials Name Provider Type    Rizwan Nathan OT Occupational Therapist                   Obj/Interventions       Row Name 04/10/25 1131          Sensory Assessment (Somatosensory)    Sensory Assessment (Somatosensory) UE sensation intact  -AV       Row Name 04/10/25 1131          Vision Assessment/Intervention    Visual Impairment/Limitations WFL;corrective lenses for reading  -AV       Row Name 04/10/25 1131          Range of Motion Comprehensive    General Range of Motion bilateral upper extremity ROM WFL  -AV     Comment, General Range of Motion AROM  -AV       Row Name 04/10/25 1131          Strength Comprehensive (MMT)    Comment, General Manual Muscle Testing (MMT) Assessment 4(-)/5 bilateral biceps, triceps and   -AV       Row Name 04/10/25 1131          Motor Skills    Motor Skills coordination;functional endurance  -AV     Coordination WFL  right dominant  -AV     Functional Endurance fair minus  -AV       Row Name 04/10/25 1131          Balance    Comment, Balance pleasantly declined testing: CGA/RW per report  -AV               User Key  (r) = Recorded By, (t) = Taken By, (c) = Cosigned By      Initials Name Provider Type    Rizwan Nathan OT Occupational Therapist                   Goals/Plan       Row Name 04/10/25 1133          Transfer Goal 1 (OT)    Activity/Assistive Device (Transfer Goal 1, OT) transfers, " all  -AV     [place] Level/Cues Needed (Transfer Goal 1, OT) modified independence  -AV     Time Frame (Transfer Goal 1, OT) long term goal (LTG);10 days  -AV       Row Name 04/10/25 1133          Bathing Goal 1 (OT)    Activity/Device (Bathing Goal 1, OT) bathing skills, all  -AV     [place] Level/Cues Needed (Bathing Goal 1, OT) modified independence  -AV     Time Frame (Bathing Goal 1, OT) long term goal (LTG);10 days  -AV       Row Name 04/10/25 1133          Dressing Goal 1 (OT)    Activity/Device (Dressing Goal 1, OT) dressing skills, all  -AV     [place]/Cues Needed (Dressing Goal 1, OT) modified independence  -AV     Time Frame (Dressing Goal 1, OT) long term goal (LTG);10 days  -AV       Row Name 04/10/25 1133          Toileting Goal 1 (OT)    Activity/Device (Toileting Goal 1, OT) toileting skills, all;raised toilet seat  -AV     [place] Level/Cues Needed (Toileting Goal 1, OT) modified independence  -AV     Time Frame (Toileting Goal 1, OT) long term goal (LTG);10 days  -AV       Row Name 04/10/25 1133          Grooming Goal 1 (OT)    Activity/Device (Grooming Goal 1, OT) grooming skills, all  standing at sink  -AV     [place] (Grooming Goal 1, OT) modified independence  -AV     Time Frame (Grooming Goal 1, OT) long term goal (LTG);10 days  -AV       Row Name 04/10/25 1133          Strength Goal 1 (OT)    Strength Goal 1 (OT) Patient will demonstrate 4/5 bilateral biceps, triceps and  to increase ADL/transfer independence.  -AV     Time Frame (Strength Goal 1, OT) long term goal (LTG);10 days  -AV       Row Name 04/10/25 1133          Problem Specific Goal 1 (OT)    Problem Specific Goal 1 (OT) Patient will demonstrate fair endurance/activity tolerance needed to support ADLs.  -AV     Time Frame (Problem Specific Goal 1, OT) long term goal (LTG);10 days  -AV       Row Name 04/10/25 1133          Therapy Assessment/Plan (OT)    Planned Therapy Interventions (OT) activity  tolerance training;BADL retraining;functional balance retraining;occupation/activity based interventions;patient/caregiver education/training;ROM/therapeutic exercise;strengthening exercise;transfer/mobility retraining  -AV               User Key  (r) = Recorded By, (t) = Taken By, (c) = Cosigned By      Initials Name Provider Type    Rizwan Nathan, TAO Occupational Therapist                   Clinical Impression       Row Name 04/10/25 1132          Pain Assessment    Additional Documentation Pain Scale: FACES Pre/Post-Treatment (Group)  -AV       Row Name 04/10/25 1132          Pain Scale: FACES Pre/Post-Treatment    Pain: FACES Scale, Pretreatment 0-->no hurt  -AV     Posttreatment Pain Rating 0-->no hurt  -AV       Row Name 04/10/25 1132          Plan of Care Review    Plan of Care Reviewed With patient  -AV     Progress no change  First session for evaluation  -AV     Outcome Evaluation Patient presents with limitations of balance, strength, and endurance/activity tolerance which are impacting ADL/ transfer independence. Skilled OT services are indicated to remediate/ compensate for deficits to maximize independence and safety with functional ADL tasks.  -AV       Row Name 04/10/25 1132          Therapy Assessment/Plan (OT)    Patient/Family Therapy Goal Statement (OT) regain independence  -AV     Rehab Potential (OT) good  -AV     Criteria for Skilled Therapeutic Interventions Met (OT) yes;meets criteria;skilled treatment is necessary  -AV     Therapy Frequency (OT) 5 times/wk  -AV       Row Name 04/10/25 1132          Therapy Plan Review/Discharge Plan (OT)    Equipment Needs Upon Discharge (OT) walker, rolling;commode chair;tub bench  -AV     Anticipated Discharge Disposition (OT) skilled nursing facility  -AV       Row Name 04/10/25 1132          Vital Signs    O2 Delivery Pre Treatment nasal cannula  -AV     O2 Delivery Intra Treatment nasal cannula  -AV     O2 Delivery Post Treatment nasal cannula  -AV        Row Name 04/10/25 1132          Positioning and Restraints    Pre-Treatment Position in bed  -AV     Post Treatment Position bed  -AV     In Bed call light within reach;encouraged to call for assist  -AV               User Key  (r) = Recorded By, (t) = Taken By, (c) = Cosigned By      Initials Name Provider Type    Rizwan Nathan OT Occupational Therapist                   Outcome Measures       Row Name 04/10/25 1134          How much help from another is currently needed...    Putting on and taking off regular lower body clothing? 3  -AV     Bathing (including washing, rinsing, and drying) 3  -AV     Toileting (which includes using toilet bed pan or urinal) 3  -AV     Putting on and taking off regular upper body clothing 4  -AV     Taking care of personal grooming (such as brushing teeth) 4  -AV     Eating meals 4  -AV     AM-PAC 6 Clicks Score (OT) 21  -AV       Row Name 04/10/25 1134          Functional Assessment    Outcome Measure Options AM-PAC 6 Clicks Daily Activity (OT);Optimal Instrument  -AV       Row Name 04/10/25 1134          Optimal Instrument    Optimal Instrument Optimal - 3  -AV     Bending/Stooping 2  -AV     Standing 2  -AV     Reaching 1  -AV     From the list, choose the 3 activities you would most like to be able to do without any difficulty Bending/stooping;Standing;Reaching  -AV     Total Score Optimal - 3 5  -AV               User Key  (r) = Recorded By, (t) = Taken By, (c) = Cosigned By      Initials Name Provider Type    Rizwan Nathan OT Occupational Therapist                    Occupational Therapy Education       Title: PT OT SLP Therapies (Done)       Topic: Occupational Therapy (Done)       Point: ADL training (Done)       Learning Progress Summary            Patient Acceptance, E, VU by MARGARETH at 4/10/2025 1135                      Point: Home exercise program (Done)       Learning Progress Summary            Patient Acceptance, E, VU by MARGARETH at 4/10/2025 1135                       Point: Precautions (Done)       Learning Progress Summary            Patient Acceptance, E, VU by AV at 4/10/2025 1135                      Point: Body mechanics (Done)       Learning Progress Summary            Patient Acceptance, E, VU by AV at 4/10/2025 1135                                      User Key       Initials Effective Dates Name Provider Type Discipline    AV 06/16/21 -  Rizwan Gamboa OT Occupational Therapist OT                  OT Recommendation and Plan  Planned Therapy Interventions (OT): activity tolerance training, BADL retraining, functional balance retraining, occupation/activity based interventions, patient/caregiver education/training, ROM/therapeutic exercise, strengthening exercise, transfer/mobility retraining  Therapy Frequency (OT): 5 times/wk  Plan of Care Review  Plan of Care Reviewed With: patient  Progress: no change (First session for evaluation)  Outcome Evaluation: Patient presents with limitations of balance, strength, and endurance/activity tolerance which are impacting ADL/ transfer independence. Skilled OT services are indicated to remediate/ compensate for deficits to maximize independence and safety with functional ADL tasks.     Time Calculation:   Evaluation Complexity (OT)  Review Occupational Profile/Medical/Therapy History Complexity: expanded/moderate complexity  Assessment, Occupational Performance/Identification of Deficit Complexity: 1-3 performance deficits  Clinical Decision Making Complexity (OT): problem focused assessment/low complexity  Overall Complexity of Evaluation (OT): low complexity     Time Calculation- OT       Row Name 04/10/25 1136             Time Calculation- OT    OT Received On 04/10/25  -AV      OT Goal Re-Cert Due Date 04/19/25  -AV         Untimed Charges    OT Eval/Re-eval Minutes 32  -AV         Total Minutes    Untimed Charges Total Minutes 32  -AV       Total Minutes 32  -AV                User Key  (r) = Recorded By, (t) = Taken  By, (c) = Cosigned By      Initials Name Provider Type    AV Rizwan Gamboa, TAO Occupational Therapist                  Therapy Charges for Today       Code Description Service Date Service Provider Modifiers Qty    12843523047 HC OT EVAL LOW COMPLEXITY 3 4/10/2025 Rizwan Gamboa OT GO 1                 Rizwan Gamboa OT  4/10/2025

## 2025-04-10 NOTE — CONSULTS
04/10/25 1337   Coping/Psychosocial   Additional Documentation Spiritual Care (Group)   Spiritual Care   Spiritual Care Visit Type initial   Spiritual Care Source palliative care   Receptivity to Spiritual Care visit welcomed   Spiritual Care Request end-of-life issue(s) support;spiritual/moral support   Spiritual Care Interventions supportive conversation provided   Response to Spiritual Care receptive of support;engaged in conversation   Use of Spiritual Resources spirituality for coping, indicated strong use of   Spiritual Care Follow-Up follow-up, none required as presently assessed  (Luz Maria shares that she will be going home with hospice.)

## 2025-04-10 NOTE — CONSULTS
[facility]   Hematology/Oncology  Consult Note    Patient Name: Luz Maria Burnett  : 1950  MRN: [id number]  Primary Care Physician:  Panfilo Ward MD  Referring Physician: No ref. provider found  Date of admission: 2025    Subjective   Subjective     Reason for Consult/ Chief Complaint: shortness of breath    HPI:  Luz Maria Burnett is a 74 y.o. female with advanced COPD and recently diagnosed metastatic adenocarcinoma of the right lung who presented to the hospital with several days of shortness of breath.  Symptoms associated with poor appetite and weight loss.  No fevers or chills. On presentation to hospital, patient was found to have lactic acidosis, elevated procalcitonin and leukocytosis.  CT angio of the chest was performed.  No PE was seen but did show a large necrotic mass in the right thorax as well as metastatic left-sided pulmonary nodules and concern for postobstructive pneumonia with questionable lymphangitic spread.  Patient had been started on broad-spectrum antibiotics.  Pulmonology as well as radiation oncology has been consulted.  Patient requires chemotherapy to relieve obstruction however she has active infection.  Patient reports she has had a good Lupron and prefers to forego chemotherapy and radiation.  She prefers to go home and focus on comfort.  Palliative care has been consulted.    Review of Systems   All systems were reviewed and negative except for as mentioned above.     Personal History     Past Medical History:   Diagnosis Date    Ankle pain, right     Cancer     skin cancers removed, R lung    Colon polyp 2000    have had 4 or 5 colonoscopy    COPD (chronic obstructive pulmonary disease)     Depression     Foot pain, right     GERD (gastroesophageal reflux disease)     Irritable bowel syndrome     Psoriasis        Past Surgical History:   Procedure Laterality Date    BRONCHOSCOPY N/A 3/25/2025    Procedure: BRONCHOSCOPY WITH ENDOBRONCHIAL ULTRASOUND:  NEEDLE ASPIRATE, BAL, WASHINGS, BIOPSIES insertion of lighted instrument to view inside the lung and ultrasound examination with possible ultrasound-guided tissue removal for testing;  Surgeon: Suman Best MD;  Location: [facility] MAIN OR;  Service: Pulmonary;  Laterality: N/A;    COLONOSCOPY      COLONOSCOPY N/A 12/09/2021    Procedure: COLONOSCOPY WITH BIOPSY;  Surgeon: Mil Kennedy MD;  Location: [facility] ENDOSCOPY;  Service: General;  Laterality: N/A;  NORMAL COLONOSCOPY    EYE SURGERY  2010    cataracs    LAPAROSCOPIC TUBAL LIGATION      TUBAL ABDOMINAL LIGATION  1986       Family History: family history includes Arthritis in her mother; Asthma in her mother; Cancer in her brother and brother; Diabetes in her brother, brother, brother, and father; Early death in her father; Hearing loss in her brother and father; Heart disease in her father; Hyperlipidemia in her father. Otherwise pertinent FHx was reviewed and not pertinent to current issue.    Social History:  reports that she quit smoking about 3 months ago. Her smoking use included cigarettes. She started smoking about 55 years ago. She has a 50.3 pack-year smoking history. She has never used smokeless tobacco. She reports that she does not currently use alcohol. She reports that she does not use drugs.    Home Medications:  FLUoxetine, folic acid, guaiFENesin, loperamide, methotrexate, omeprazole OTC, and rosuvastatin    Allergies:  No Known Allergies    Objective    Objective     Vitals:   Temp:  [97.6 °F (36.4 °C)-98.4 °F (36.9 °C)] 97.6 °F (36.4 °C)  Heart Rate:  [] 124  Resp:  [18] 18  BP: (102-136)/(51-68) 124/56  Flow (L/min) (Oxygen Therapy):  [1-2] 1    Physical Exam:   Constitutional: Thin female, laying in bed, NAD on NC   Eyes: PERRLA, sclerae anicteric, no conjunctival injection   HENT: NCAT, mucous membranes moist   Respiratory: nonlabored respirations    Cardiovascular: no edema in the extremities   Gastrointestinal:  nondistended   Musculoskeletal: Normal strength and tone, no joint swelling   Psychiatric: Appropriate affect, cooperative   Neurologic: Awake, alert, speech clear   Skin: Normal tone, no rashes    Result Review    Result Review:  I have personally reviewed the results from the time of this admission to 4/10/2025 12:51 EDT and agree with these findings:  [x]  Laboratory  []  Microbiology  [x]  Radiology  []  EKG/Telemetry   []  Cardiology/Vascular   []  Pathology  []  Old records  []  Other:  Most notable findings include: CT with right sided large mass, H and P personally reviewed, rad onc note personally reviewed, creatinine normal, WBC elevated, hgb low, plts normal, lactate normalized    Assessment & Plan   Assessment / Plan     Brief Patient Summary:  Luz Maria Burnett is a 74 y.o. female who Has recently diagnosed metastatic lung cancer presents with postobstructive pneumonia    Active Hospital Problems:  Active Hospital Problems    Diagnosis     **Acute hypoxic respiratory failure     Severe protein-calorie malnutrition        Plan:   Metastatic lung cancer  Postobstructive pneumonia  Acute hypoxic respiratory failure  Patient with recently diagnosed metastatic lung cancer.  Initial plan was to start immunotherapy outpatient due to elevated PD-L1.  However patient currently admitted with postobstructive pneumonia.  I discussed her case with pulmonology.  Unfortunately due to patient's current condition she requires immediate treatment with quicker onset of action than the immunotherapy would provide as her pneumonia will not resolve because of the obstruction from her active malignancy..  Therefore the recommended treatment would be with chemotherapy combined with immunotherapy due to its quicker onset of action.  Chemotherapy administration would of course be very risky in setting of active infection and no guarantees of treatment effectiveness can be made. Radiation to the mass could also be considered but  ultimately chemotherapy would be required.  This was discussed with patient.  If chemotherapy is planned then she would at least need a few more days of antibiotic therapy to calm down any active infection prior to proceeding.  Alternative option would be to not proceed with any cancer directed care and focus on comfort.  This will be with hospice.  Patient prefers this.  Reports she does not want to undergo the risks of chemotherapy.  She wants to focus on quality of life with her spouse at home.  Discussed that this is very reasonable considering her unfortunate situation.  Patient to hopefully discharge tomorrow with hospice.    Electronically signed by Ruddy Rossi MD, 04/10/25, 12:51 PM EDT.

## 2025-04-10 NOTE — NURSING NOTE
[facility] met with pt at 1330 and determined pt eligibility and they could admit tonight. Primary RN, MD updated. [facility] is [facility] Meds to bed and EMS DNR form will be completed prior to dc by Primary RN. Appreciate Primary RN assistance.       Chen DUNCAN RN  Palliative Care

## 2025-04-10 NOTE — CONSULTS
Primary RN inocencio that pt would like to talk as pt was not interested in radiation. Met with pt who was resting in bed awake and alert. Pt verbalized that she spoke with spouse and would like to just go home and not have any of the treatments. Provided emotional support and discussed pt wishes. Pt verbalized  is supporting her decision to go home with [facility]. Pt verbalized she is tired and just wants to be at home with family and pass peacefully. Discussed in detail [facility] services and pt verbalized wanting to go home as soon as possible. Have discussed with MD, KULDEEP and Primary RN. Consult has been sent and will attempt to try for today however dc will most likely be tomorrow. Have talked with spouse and provided education and emotional support over the phone. Son did not want to talk but spouse verbalized he would talk with him and that he would give him contact information should he have question or needs. Called sister in law who is retired nurse and helping pt how she can through this process to update that pt wants to go home with [facility] services and that pt would like for her to be with her or on phone during consult with [facility] which has also been relayed to [facility] [place] [facility]. Palliative care will update as more is known and continue to follow to help facilitate getting pt home.       Chen DUNCAN RN  Palliative Care

## 2025-04-10 NOTE — PROGRESS NOTES
" [facility]     Progress Note    Patient Name: Luz Maria Burnett  : 1950  MRN: [id number]  Primary Care Physician:  Panfilo Ward MD  Date of admission: 2025    Subjective   Subjective     Chief Complaint: Reason for consultation lung mass    History of Present Illness  Patient Reports that she wants to \"give up\"  No longer wanting to undergo treatment    Review of Systems    Objective   Objective     Vitals:   Temp:  [97.6 °F (36.4 °C)-98.4 °F (36.9 °C)] 97.6 °F (36.4 °C)  Heart Rate:  [] 104  Resp:  [18] 18  BP: (102-136)/(51-68) 102/56  Flow (L/min) (Oxygen Therapy):  [1-2] 1    Physical Exam   Pleasant female  Result Review    Result Review:  I have personally reviewed the results from the time of this admission to 4/10/2025 14:54 EDT and agree with these findings:  [x]  Laboratory list / accordion  [x]  Microbiology  [x]  Radiology  []  EKG/Telemetry   []  Cardiology/Vascular   [x]  Pathology  []  Old records  []  Other:        Assessment & Plan   Assessment / Plan     Brief Patient Summary:  Luz Maria Burnett is a 74 y.o. female who is hospital with postobstructive pneumonia    Active Hospital Problems:  Active Hospital Problems    Diagnosis     **Acute hypoxic respiratory failure     Severe protein-calorie malnutrition      Plan:   Discussion had with the patient  Patient states she no longer wants treatment  Wants to go home with hospice  Per my standpoint would recommend discharging patient on Augmentin 1 tablet twice daily  Continue with Brovana and Pulmicort  Agree with hospice  Will sign off at this time    VTE Prophylaxis:  Mechanical VTE prophylaxis orders are present.        CODE STATUS:    Code Status (Patient has no pulse and is not breathing): No CPR (Do Not Attempt to Resuscitate)  Medical Interventions (Patient has pulse or is breathing): Comfort Measures    Ace Mcdaniel DO    Electronically signed by Ace Mcdaniel DO, 04/10/25, 2:59 PM EDT. "

## 2025-04-10 NOTE — PLAN OF CARE
Goal Outcome Evaluation:  Plan of Care Reviewed With: patient        Progress: no change (First session for evaluation)  Outcome Evaluation: Patient presents with limitations of balance, strength, and endurance/activity tolerance which are impacting ADL/ transfer independence. Skilled OT services are indicated to remediate/ compensate for deficits to maximize independence and safety with functional ADL tasks.    Anticipated Discharge Disposition (OT): skilled nursing facility

## 2025-04-10 NOTE — THERAPY EVALUATION
Acute Care - Physical Therapy Initial Evaluation   [facility]     Patient Name: Luz Maria Burnett  : 1950  MRN: [id number]  Today's Date: 4/10/2025      Visit Dx:     ICD-10-CM ICD-9-CM   1. Pneumonia due to infectious organism, unspecified laterality, unspecified part of lung  J18.9 486   2. Sepsis, due to unspecified organism, unspecified whether acute organ dysfunction present  A41.9 038.9     995.91   3. Malignant neoplasm of lower lobe, right bronchus or lung  C34.31 162.5   4. Decreased activities of daily living (ADL)  Z78.9 V49.89   5. Difficulty walking  R26.2 719.7     Patient Active Problem List   Diagnosis    Change in bowel habits    Diarrhea    Family history of colon cancer    Anxiety and depression    Colon polyps    Encounter for long-term (current) use of other medications    Lung cancer screening declined by patient    Nicotine dependence    Psoriasis    Anxiety and depression    Colon polyps    Lung cancer screening declined by patient    Nicotine dependence    Psoriasis    Lung mass    Malignant neoplasm of lower lobe of right lung    Acute hypoxic respiratory failure    Severe protein-calorie malnutrition     Past Medical History:   Diagnosis Date    Ankle pain, right     Cancer     skin cancers removed, R lung    Colon polyp 2000    have had 4 or 5 colonoscopy    COPD (chronic obstructive pulmonary disease)     Depression     Foot pain, right     GERD (gastroesophageal reflux disease)     Irritable bowel syndrome     Psoriasis      Past Surgical History:   Procedure Laterality Date    BRONCHOSCOPY N/A 3/25/2025    Procedure: BRONCHOSCOPY WITH ENDOBRONCHIAL ULTRASOUND: NEEDLE ASPIRATE, BAL, WASHINGS, BIOPSIES insertion of lighted instrument to view inside the lung and ultrasound examination with possible ultrasound-guided tissue removal for testing;  Surgeon: Suman Best MD;  Location: [facility] MAIN OR;  Service: Pulmonary;  Laterality: N/A;    COLONOSCOPY      COLONOSCOPY N/A  12/09/2021    Procedure: COLONOSCOPY WITH BIOPSY;  Surgeon: Mil Kennedy MD;  Location: [facility] ENDOSCOPY;  Service: General;  Laterality: N/A;  NORMAL COLONOSCOPY    EYE SURGERY  2010    cataracs    LAPAROSCOPIC TUBAL LIGATION      TUBAL ABDOMINAL LIGATION  1986     PT Assessment (Last 12 Hours)       PT Evaluation and Treatment       Row Name 04/10/25 1400          Physical Therapy Time and Intention    Subjective Information no complaints  -DP     Document Type evaluation  -DP     Mode of Treatment individual therapy;physical therapy  -DP     Patient Effort good  -DP       Row Name 04/10/25 1400          General Information    Patient Profile Reviewed yes  -DP     Patient Observations alert;cooperative  -DP     Prior Level of Function independent:;gait;transfer;bed mobility;ADL's  -DP     Existing Precautions/Restrictions fall  -DP     Barriers to Rehab none identified  -DP       Row Name 04/10/25 1400          Living Environment    Current Living Arrangements home  -DP     People in Home spouse  -DP       Row Name 04/10/25 1400          Range of Motion (ROM)    Range of Motion bilateral lower extremities;ROM is WFL  -DP       Row Name 04/10/25 1400          Strength Comprehensive (MMT)    General Manual Muscle Testing (MMT) Assessment lower extremity strength deficits identified  -DP     Comment, General Manual Muscle Testing (MMT) Assessment BLE: 4-/5  -DP       Row Name 04/10/25 1400          Bed Mobility    Bed Mobility supine-sit-supine  -DP     Supine-Sit-Supine [place] (Bed Mobility) minimum assist (75% patient effort)  -DP       Row Name 04/10/25 1400          Transfers    Transfers sit-stand transfer  -DP       Row Name 04/10/25 1400          Sit-Stand Transfer    Sit-Stand [place] (Transfers) minimum assist (75% patient effort)  -DP       Row Name 04/10/25 1400          Gait/Stairs (Locomotion)    Comment, (Gait/Stairs) declined  -DP       Row Name 04/10/25 1400          Balance     Balance Assessment standing dynamic balance  -DP     Dynamic Standing Balance contact guard  -DP     Position/Device Used, Standing Balance supported;walker, front-wheeled  -DP       Row Name 04/10/25 1400          Plan of Care Review    Plan of Care Reviewed With patient  -DP     Outcome Evaluation Pt presents with decreased strength, transfers and functional mobility. Will benefit from inpatient PT services and continued PT services upon discharge.  -DP       Row Name 04/10/25 1400          Therapy Assessment/Plan (PT)    Criteria for Skilled Interventions Met (PT) yes;meets criteria  -DP     Therapy Frequency (PT) daily  -DP     Predicted Duration of Therapy Intervention (PT) 10 days  -DP     Problem List (PT) problems related to;mobility  -DP     Activity Limitations Related to Problem List (PT) unable to transfer safely;unable to ambulate safely  -DP       Row Name 04/10/25 1400          PT Evaluation Complexity    History, PT Evaluation Complexity no personal factors and/or comorbidities  -DP     Examination of Body Systems (PT Eval Complexity) total of 4 or more elements  -DP     Clinical Presentation (PT Evaluation Complexity) stable  -DP     Clinical Decision Making (PT Evaluation Complexity) low complexity  -DP     Overall Complexity (PT Evaluation Complexity) low complexity  -DP       Row Name 04/10/25 1400          Physical Therapy Goals    Transfer Goal Selection (PT) transfer, PT goal 1  -DP     Gait Training Goal Selection (PT) gait training, PT goal 1  -DP       Row Name 04/10/25 1400          Transfer Goal 1 (PT)    Activity/Assistive Device (Transfer Goal 1, PT) sit-to-stand/stand-to-sit  -DP     [place] Level/Cues Needed (Transfer Goal 1, PT) supervision required  -DP     Time Frame (Transfer Goal 1, PT) 10 days  -DP       Row Name 04/10/25 1400          Gait Training Goal 1 (PT)    Activity/Assistive Device (Gait Training Goal 1, PT) assistive device use;walker, rolling  -DP     [place]  Level (Gait Training Goal 1, PT) supervision required  -DP     Distance (Gait Training Goal 1, PT) 200  -DP     Time Frame (Gait Training Goal 1, PT) 10 days  -DP               User Key  (r) = Recorded By, (t) = Taken By, (c) = Cosigned By      Initials Name Provider Type    Bhumi Shrestha PT Physical Therapist                      PT Recommendation and Plan  Anticipated Discharge Disposition (PT): skilled nursing facility  Planned Therapy Interventions (PT): balance training, bed mobility training, gait training, strengthening, transfer training  Therapy Frequency (PT): daily  Plan of Care Reviewed With: patient  Outcome Evaluation: Pt presents with decreased strength, transfers and functional mobility. Will benefit from inpatient PT services and continued PT services upon discharge.   Outcome Measures       Row Name 04/10/25 1500             How much help from another person do you currently need...    Turning from your back to your side while in flat bed without using bedrails? 4  -DP      Moving from lying on back to sitting on the side of a flat bed without bedrails? 3  -DP      Moving to and from a bed to a chair (including a wheelchair)? 3  -DP      Standing up from a chair using your arms (e.g., wheelchair, bedside chair)? 3  -DP      Climbing 3-5 steps with a railing? 3  -DP      To walk in hospital room? 3  -DP      AM-PAC 6 Clicks Score (PT) 19  -DP         Functional Assessment    Outcome Measure Options AM-PAC 6 Clicks Basic Mobility (PT)  -DP                User Key  (r) = Recorded By, (t) = Taken By, (c) = Cosigned By      Initials Name Provider Type    Bhumi Shrestha PT Physical Therapist                     Time Calculation:    PT Charges       Row Name 04/10/25 1501             Time Calculation    PT Received On 04/10/25  -DP      PT Goal Re-Cert Due Date 04/19/25  -DP         Untimed Charges    PT Eval/Re-eval Minutes 25  -DP         Total Minutes    Untimed Charges Total Minutes 25  -DP        Total Minutes 25  -DP                User Key  (r) = Recorded By, (t) = Taken By, (c) = Cosigned By      Initials Name Provider Type    Bhumi Shrestha PT Physical Therapist                      PT G-Codes  Outcome Measure Options: AM-PAC 6 Clicks Basic Mobility (PT)  AM-PAC 6 Clicks Score (PT): 19  AM-PAC 6 Clicks Score (OT): 21    Bhumi Melendez PT  4/10/2025

## 2025-04-13 LAB
BACTERIA SPEC AEROBE CULT: NORMAL
BACTERIA SPEC AEROBE CULT: NORMAL

## 2025-04-15 LAB
FUNGUS WND CULT: NORMAL
MYCOBACTERIUM SPEC CULT: NORMAL
NIGHT BLUE STAIN TISS: NORMAL
NIGHT BLUE STAIN TISS: NORMAL
QT INTERVAL: 326 MS
QTC INTERVAL: 465 MS

## 2025-04-29 LAB
MYCOBACTERIUM SPEC CULT: NORMAL
NIGHT BLUE STAIN TISS: NORMAL
NIGHT BLUE STAIN TISS: NORMAL

## 2025-05-06 LAB
MYCOBACTERIUM SPEC CULT: NORMAL
NIGHT BLUE STAIN TISS: NORMAL
NIGHT BLUE STAIN TISS: NORMAL

## (undated) DEVICE — FRCP BIOP ALLGTR/CUP 5MM 115CM DISP

## (undated) DEVICE — SYR LUER SLPTP 50ML

## (undated) DEVICE — SINGLE USE BIOPSY VALVE MAJ-210: Brand: SINGLE USE BIOPSY VALVE (STERILE)

## (undated) DEVICE — Device: Brand: BALLOON

## (undated) DEVICE — PENCL E/S SMOKEEVAC W/TELESCP CANN

## (undated) DEVICE — LINER SURG CANSTR SXN S/RIGD 1500CC

## (undated) DEVICE — MAJ-1414 SINGLE USE ADPATER BIOPSY VALV: Brand: SINGLE USE ADAPTOR BIOPSY VALVE

## (undated) DEVICE — SOL IRR NACL 0.9PCT BO 500ML

## (undated) DEVICE — Device: Brand: DEFENDO AIR/WATER/SUCTION AND BIOPSY VALVE

## (undated) DEVICE — NDL FLTR BLNT 18G 1 1/2IN

## (undated) DEVICE — DISPOSABLE CYTOLOGY BRUSH: Brand: DISPOSABLE CYTOLOGY BRUSH

## (undated) DEVICE — SOL IRR H2O BO 100ML STRL

## (undated) DEVICE — STPCK 1WY SPINLOCK FML LL NONDEHP LF .20ML

## (undated) DEVICE — SYR SLP TP 10ML DISP

## (undated) DEVICE — BLCK/BITE BLOX WO/DENTL/RIM W/STRAP 54F

## (undated) DEVICE — SINGLE-USE BIOPSY FORCEPS: Brand: RADIAL JAW 4

## (undated) DEVICE — TRAP,MUCUS SPECIMEN,40CC: Brand: MEDLINE

## (undated) DEVICE — SINGLE USE SUCTION VALVE MAJ-209: Brand: SINGLE USE SUCTION VALVE (STERILE)

## (undated) DEVICE — KT COMPLIANCE ENDO PREV INFCT

## (undated) DEVICE — SOL IRRG H2O PL/BG 1000ML STRL

## (undated) DEVICE — BASN EMESS 500ML GRY

## (undated) DEVICE — CONTAINER,SPEC,PNEUM TUBE,3OZ,STRL PATH: Brand: MEDLINE

## (undated) DEVICE — DEV ATOMIZATION MUCOSAL/NASALTRACH

## (undated) DEVICE — SINGLE USE ASPIRATION NEEDLE: Brand: SINGLE USE ASPIRATION NEEDLE

## (undated) DEVICE — SOLIDIFIER LIQLOC PLS 1500CC BT

## (undated) DEVICE — DISPOSABLE BIOPSY FORCEPS: Brand: DISPOSABLE BIOPSY FORCEPS

## (undated) DEVICE — COLON KIT: Brand: MEDLINE INDUSTRIES, INC.